# Patient Record
Sex: MALE | Race: WHITE | NOT HISPANIC OR LATINO
[De-identification: names, ages, dates, MRNs, and addresses within clinical notes are randomized per-mention and may not be internally consistent; named-entity substitution may affect disease eponyms.]

---

## 2017-02-22 ENCOUNTER — TRANSCRIPTION ENCOUNTER (OUTPATIENT)
Age: 52
End: 2017-02-22

## 2019-11-29 ENCOUNTER — INPATIENT (INPATIENT)
Facility: HOSPITAL | Age: 54
LOS: 0 days | Discharge: HOME | End: 2019-11-30
Attending: HOSPITALIST | Admitting: HOSPITALIST
Payer: COMMERCIAL

## 2019-11-29 VITALS
TEMPERATURE: 97 F | OXYGEN SATURATION: 98 % | SYSTOLIC BLOOD PRESSURE: 156 MMHG | DIASTOLIC BLOOD PRESSURE: 80 MMHG | RESPIRATION RATE: 18 BRPM | HEART RATE: 74 BPM

## 2019-11-29 LAB
ALBUMIN SERPL ELPH-MCNC: 4.8 G/DL — SIGNIFICANT CHANGE UP (ref 3.5–5.2)
ALP SERPL-CCNC: 70 U/L — SIGNIFICANT CHANGE UP (ref 30–115)
ALT FLD-CCNC: 19 U/L — SIGNIFICANT CHANGE UP (ref 0–41)
ANION GAP SERPL CALC-SCNC: 16 MMOL/L — HIGH (ref 7–14)
AST SERPL-CCNC: 37 U/L — SIGNIFICANT CHANGE UP (ref 0–41)
BASOPHILS # BLD AUTO: 0.05 K/UL — SIGNIFICANT CHANGE UP (ref 0–0.2)
BASOPHILS NFR BLD AUTO: 0.6 % — SIGNIFICANT CHANGE UP (ref 0–1)
BILIRUB SERPL-MCNC: 0.5 MG/DL — SIGNIFICANT CHANGE UP (ref 0.2–1.2)
BUN SERPL-MCNC: 19 MG/DL — SIGNIFICANT CHANGE UP (ref 10–20)
CALCIUM SERPL-MCNC: 9.6 MG/DL — SIGNIFICANT CHANGE UP (ref 8.5–10.1)
CHLORIDE SERPL-SCNC: 103 MMOL/L — SIGNIFICANT CHANGE UP (ref 98–110)
CK MB CFR SERPL CALC: 44.8 NG/ML — HIGH (ref 0.6–6.3)
CO2 SERPL-SCNC: 21 MMOL/L — SIGNIFICANT CHANGE UP (ref 17–32)
CREAT SERPL-MCNC: 0.8 MG/DL — SIGNIFICANT CHANGE UP (ref 0.7–1.5)
EOSINOPHIL # BLD AUTO: 0.24 K/UL — SIGNIFICANT CHANGE UP (ref 0–0.7)
EOSINOPHIL NFR BLD AUTO: 2.6 % — SIGNIFICANT CHANGE UP (ref 0–8)
GLUCOSE SERPL-MCNC: 102 MG/DL — HIGH (ref 70–99)
HCT VFR BLD CALC: 49.8 % — SIGNIFICANT CHANGE UP (ref 42–52)
HGB BLD-MCNC: 16.5 G/DL — SIGNIFICANT CHANGE UP (ref 14–18)
IMM GRANULOCYTES NFR BLD AUTO: 0.4 % — HIGH (ref 0.1–0.3)
LIDOCAIN IGE QN: 11 U/L — SIGNIFICANT CHANGE UP (ref 7–60)
LYMPHOCYTES # BLD AUTO: 2.04 K/UL — SIGNIFICANT CHANGE UP (ref 1.2–3.4)
LYMPHOCYTES # BLD AUTO: 22.5 % — SIGNIFICANT CHANGE UP (ref 20.5–51.1)
MAGNESIUM SERPL-MCNC: 1.8 MG/DL — SIGNIFICANT CHANGE UP (ref 1.8–2.4)
MCHC RBC-ENTMCNC: 28.5 PG — SIGNIFICANT CHANGE UP (ref 27–31)
MCHC RBC-ENTMCNC: 33.1 G/DL — SIGNIFICANT CHANGE UP (ref 32–37)
MCV RBC AUTO: 86 FL — SIGNIFICANT CHANGE UP (ref 80–94)
MONOCYTES # BLD AUTO: 0.82 K/UL — HIGH (ref 0.1–0.6)
MONOCYTES NFR BLD AUTO: 9 % — SIGNIFICANT CHANGE UP (ref 1.7–9.3)
NEUTROPHILS # BLD AUTO: 5.88 K/UL — SIGNIFICANT CHANGE UP (ref 1.4–6.5)
NEUTROPHILS NFR BLD AUTO: 64.9 % — SIGNIFICANT CHANGE UP (ref 42.2–75.2)
NRBC # BLD: 0 /100 WBCS — SIGNIFICANT CHANGE UP (ref 0–0)
PLATELET # BLD AUTO: 291 K/UL — SIGNIFICANT CHANGE UP (ref 130–400)
POTASSIUM SERPL-MCNC: 4.5 MMOL/L — SIGNIFICANT CHANGE UP (ref 3.5–5)
POTASSIUM SERPL-SCNC: 4.5 MMOL/L — SIGNIFICANT CHANGE UP (ref 3.5–5)
PROT SERPL-MCNC: 7.5 G/DL — SIGNIFICANT CHANGE UP (ref 6–8)
RBC # BLD: 5.79 M/UL — SIGNIFICANT CHANGE UP (ref 4.7–6.1)
RBC # FLD: 12.8 % — SIGNIFICANT CHANGE UP (ref 11.5–14.5)
SODIUM SERPL-SCNC: 140 MMOL/L — SIGNIFICANT CHANGE UP (ref 135–146)
TROPONIN T SERPL-MCNC: 0.09 NG/ML — CRITICAL HIGH
TROPONIN T SERPL-MCNC: 0.23 NG/ML — CRITICAL HIGH
WBC # BLD: 9.07 K/UL — SIGNIFICANT CHANGE UP (ref 4.8–10.8)
WBC # FLD AUTO: 9.07 K/UL — SIGNIFICANT CHANGE UP (ref 4.8–10.8)

## 2019-11-29 PROCEDURE — 93010 ELECTROCARDIOGRAM REPORT: CPT

## 2019-11-29 PROCEDURE — 71275 CT ANGIOGRAPHY CHEST: CPT | Mod: 26

## 2019-11-29 PROCEDURE — 71045 X-RAY EXAM CHEST 1 VIEW: CPT | Mod: 26

## 2019-11-29 PROCEDURE — 99291 CRITICAL CARE FIRST HOUR: CPT

## 2019-11-29 RX ORDER — MORPHINE SULFATE 50 MG/1
4 CAPSULE, EXTENDED RELEASE ORAL ONCE
Refills: 0 | Status: DISCONTINUED | OUTPATIENT
Start: 2019-11-29 | End: 2019-11-29

## 2019-11-29 RX ORDER — ACETAMINOPHEN 500 MG
650 TABLET ORAL ONCE
Refills: 0 | Status: COMPLETED | OUTPATIENT
Start: 2019-11-29 | End: 2019-11-29

## 2019-11-29 RX ORDER — ASPIRIN/CALCIUM CARB/MAGNESIUM 324 MG
325 TABLET ORAL ONCE
Refills: 0 | Status: COMPLETED | OUTPATIENT
Start: 2019-11-29 | End: 2019-11-29

## 2019-11-29 RX ORDER — PANTOPRAZOLE SODIUM 20 MG/1
40 TABLET, DELAYED RELEASE ORAL
Refills: 0 | Status: DISCONTINUED | OUTPATIENT
Start: 2019-11-29 | End: 2019-11-30

## 2019-11-29 RX ORDER — SODIUM CHLORIDE 9 MG/ML
1000 INJECTION, SOLUTION INTRAVENOUS ONCE
Refills: 0 | Status: COMPLETED | OUTPATIENT
Start: 2019-11-29 | End: 2019-11-29

## 2019-11-29 RX ORDER — DIPHENHYDRAMINE HCL 50 MG
50 CAPSULE ORAL ONCE
Refills: 0 | Status: COMPLETED | OUTPATIENT
Start: 2019-11-29 | End: 2019-11-29

## 2019-11-29 RX ORDER — TICAGRELOR 90 MG/1
1 TABLET ORAL
Qty: 60 | Refills: 0
Start: 2019-11-29 | End: 2019-12-28

## 2019-11-29 RX ORDER — METOPROLOL TARTRATE 50 MG
25 TABLET ORAL
Refills: 0 | Status: DISCONTINUED | OUTPATIENT
Start: 2019-11-29 | End: 2019-11-30

## 2019-11-29 RX ORDER — ONDANSETRON 8 MG/1
4 TABLET, FILM COATED ORAL ONCE
Refills: 0 | Status: COMPLETED | OUTPATIENT
Start: 2019-11-29 | End: 2019-11-29

## 2019-11-29 RX ORDER — FAMOTIDINE 10 MG/ML
20 INJECTION INTRAVENOUS ONCE
Refills: 0 | Status: COMPLETED | OUTPATIENT
Start: 2019-11-29 | End: 2019-11-29

## 2019-11-29 RX ORDER — ENOXAPARIN SODIUM 100 MG/ML
40 INJECTION SUBCUTANEOUS DAILY
Refills: 0 | Status: DISCONTINUED | OUTPATIENT
Start: 2019-11-30 | End: 2019-11-30

## 2019-11-29 RX ORDER — TICAGRELOR 90 MG/1
90 TABLET ORAL EVERY 12 HOURS
Refills: 0 | Status: DISCONTINUED | OUTPATIENT
Start: 2019-11-29 | End: 2019-11-30

## 2019-11-29 RX ORDER — AMLODIPINE BESYLATE 2.5 MG/1
5 TABLET ORAL DAILY
Refills: 0 | Status: DISCONTINUED | OUTPATIENT
Start: 2019-11-29 | End: 2019-11-30

## 2019-11-29 RX ORDER — SODIUM CHLORIDE 9 MG/ML
1000 INJECTION INTRAMUSCULAR; INTRAVENOUS; SUBCUTANEOUS
Refills: 0 | Status: DISCONTINUED | OUTPATIENT
Start: 2019-11-29 | End: 2019-11-30

## 2019-11-29 RX ORDER — NICOTINE POLACRILEX 2 MG
1 GUM BUCCAL DAILY
Refills: 0 | Status: DISCONTINUED | OUTPATIENT
Start: 2019-11-29 | End: 2019-11-30

## 2019-11-29 RX ORDER — ATORVASTATIN CALCIUM 80 MG/1
80 TABLET, FILM COATED ORAL AT BEDTIME
Refills: 0 | Status: DISCONTINUED | OUTPATIENT
Start: 2019-11-29 | End: 2019-11-30

## 2019-11-29 RX ORDER — ASPIRIN/CALCIUM CARB/MAGNESIUM 324 MG
81 TABLET ORAL DAILY
Refills: 0 | Status: DISCONTINUED | OUTPATIENT
Start: 2019-11-29 | End: 2019-11-30

## 2019-11-29 RX ADMIN — Medication 25 MILLIGRAM(S): at 18:24

## 2019-11-29 RX ADMIN — ATORVASTATIN CALCIUM 80 MILLIGRAM(S): 80 TABLET, FILM COATED ORAL at 21:50

## 2019-11-29 RX ADMIN — ONDANSETRON 4 MILLIGRAM(S): 8 TABLET, FILM COATED ORAL at 09:15

## 2019-11-29 RX ADMIN — Medication 650 MILLIGRAM(S): at 22:10

## 2019-11-29 RX ADMIN — Medication 30 MILLILITER(S): at 10:39

## 2019-11-29 RX ADMIN — MORPHINE SULFATE 4 MILLIGRAM(S): 50 CAPSULE, EXTENDED RELEASE ORAL at 10:05

## 2019-11-29 RX ADMIN — FAMOTIDINE 20 MILLIGRAM(S): 10 INJECTION INTRAVENOUS at 09:15

## 2019-11-29 RX ADMIN — TICAGRELOR 90 MILLIGRAM(S): 90 TABLET ORAL at 18:24

## 2019-11-29 RX ADMIN — Medication 50 MILLIGRAM(S): at 10:39

## 2019-11-29 RX ADMIN — Medication 325 MILLIGRAM(S): at 13:05

## 2019-11-29 RX ADMIN — Medication 650 MILLIGRAM(S): at 19:31

## 2019-11-29 RX ADMIN — SODIUM CHLORIDE 1000 MILLILITER(S): 9 INJECTION, SOLUTION INTRAVENOUS at 09:15

## 2019-11-29 NOTE — PROGRESS NOTE ADULT - SUBJECTIVE AND OBJECTIVE BOX
Cardiology Follow up    MAGED DEL CID   54y Male  PAST MEDICAL & SURGICAL HISTORY:  GERD (gastroesophageal reflux disease)  HTN (hypertension)       HPI:  54 M active smoker, + FH of premature MI in brother, gastritis, HTN, in active chest pain started few days ago, elevated trop. The pt was admitted for chest pain, pt seen and examined in ER in active chest pain, pt taken to cath lab urgently. (29 Nov 2019 13:08)    Allergies    No Known Allergies    Intolerances    Denies CP, SOB, palpitations, or dizziness  No events on telemetry overnight    Vital Signs Last 24 Hrs  T(C): 35.9 (29 Nov 2019 08:43), Max: 35.9 (29 Nov 2019 08:43)  T(F): 96.6 (29 Nov 2019 08:43), Max: 96.6 (29 Nov 2019 08:43)  HR: 74 (29 Nov 2019 08:43) (74 - 74)  BP: 156/80 (29 Nov 2019 08:43) (156/80 - 156/80)  BP(mean): --  RR: 18 (29 Nov 2019 08:43) (18 - 18)  SpO2: 98% (29 Nov 2019 08:43) (98% - 98%)    MEDICATIONS  (STANDING):  aspirin  chewable 81 milliGRAM(s) Oral daily  atorvastatin 80 milliGRAM(s) Oral at bedtime  metoprolol tartrate 25 milliGRAM(s) Oral two times a day  sodium chloride 0.9%. 1000 milliLiter(s) (100 mL/Hr) IV Continuous <Continuous>  ticagrelor 90 milliGRAM(s) Oral every 12 hours    MEDICATIONS  (PRN):      REVIEW OF SYSTEMS:          CONSTITUTIONAL: No weakness, fevers or chills          EYES/ENT: No visual changes;  No vertigo or throat pain           NECK: No pain or stiffness          RESPIRATORY: No cough, wheezing, hemoptysis          CARDIOVASCULAR: no pain, no BACA, no palpitations           GASTROINTESTINAL: No abdominal or epigastric pain. No nausea, vomiting, or hematemesis;           GENITOURINARY: No dysuria, frequency or hematuria          NEUROLOGICAL: No numbness or weakness          SKIN: No itching, rashes    PHYSICAL EXAM:           CONSTITUTIONAL: Well-developed; well-nourished; in no acute distress  	SKIN: warm, dry  	HEAD: Normocephalic; atraumatic  	EYES: PERRL.  	ENT: No nasal discharge, airway clear, mucous membranes moist  	NECK: Supple; non tender.  	CARD: +S1, +S2, no murmurs, gallops, or rubs. (Regular) rate and rhythm    	RESP: No wheezes, rales or rhonchi. CTA B/L  	ABD: soft ntnd, + BS x 4 quadrants  	EXT: moves all extremities,  no clubbing, cyanosis or edema  	NEURO: Alert and oriented x3, no focal deficits          PSYCH: Cooperative, appropriate          VASCULAR:  + Rad / + PTs / + DPs          EXTREMITY:             Right Groin:  Dressing C/D/I, + pulses, access site soft, no hematoma, no pain, no numbness, no signs and symptoms of infection  	   Right Radial:  D-stat bracelet in place, C/D/I, +pulses, access site soft, no hematoma, no pain, no numbness, no signs and symptoms of infection          LABS:                        16.5   9.07  )-----------( 291      ( 29 Nov 2019 09:11 )             49.8     11-29    140  |  103  |  19  ----------------------------<  102<H>  4.5   |  21  |  0.8    Ca    9.6      29 Nov 2019 09:11  Mg     1.8     11-29    TPro  7.5  /  Alb  4.8  /  TBili  0.5  /  DBili  x   /  AST  37  /  ALT  19  /  AlkPhos  70  11-29    CARDIAC MARKERS ( 29 Nov 2019 12:32 )  x     / 0.23 ng/mL / x     / x     / 44.8 ng/mL  CARDIAC MARKERS ( 29 Nov 2019 09:11 )  x     / 0.09 ng/mL / x     / x     / x          Magnesium, Serum: 1.8 mg/dL [1.8 - 2.4] (11-29-19 @ 09:11)  LIVER FUNCTIONS - ( 29 Nov 2019 09:11 )  Alb: 4.8 g/dL / Pro: 7.5 g/dL / ALK PHOS: 70 U/L / ALT: 19 U/L / AST: 37 U/L / GGT: x             S/P PCI prox and distal RCA SANG x 3/ NSTEMI  	     Continue DAPT (asa 81mg daily, brilinta 90mg q 12 ),  B-Blocker, Statin Therapy                   Brilinta coverage confirmed, $5 copay, pt aware and agreeable, RX sent                   monitor access sites( R radial and R femoral)                    IV hydration per orders                   Patient agreeing to take DAPT for at least one year or as directed by cardiologist                    Pt given instructions on importance of taking antiplatelet medication or risk acute stent thrombosis/death                   Post cath instructions, access site care and activity restrictions reviewed with patient                     Discussed with patient to return to hospital if experience chest pain, shortness breath, dizziness and site bleeding                   Aggressive risk factor modification, diet counseling, smoking cessation discussed with patient                       Follow up with Dr. Casey in am

## 2019-11-29 NOTE — H&P CARDIOLOGY - RADIOLOGY RESULTS AND INTERPRETATION
< from: CT Angio Chest Dissection Protocol (11.29.19 @ 11:10) >    IMPRESSION:    No evidence of intramural hematoma or aortic dissection.    < from: Xray Chest 1 View-PORTABLE IMMEDIATE (11.29.19 @ 09:18) >    IMPRESSION:      No radiographic evidence of acute cardiopulmonary disease.

## 2019-11-29 NOTE — ED PROVIDER NOTE - CARE PLAN
Principal Discharge DX:	Unstable angina  Secondary Diagnosis:	NSTEMI (non-ST elevated myocardial infarction)

## 2019-11-29 NOTE — ED PROVIDER NOTE - PROGRESS NOTE DETAILS
PODLOG: Pt with worsening pain in ED. Serial EKGs with no ischemic changes. Trop result reviewed. Pending CTA. PODLOG: CTA reviewed. Pt still with pain. Consulted cardiology who is bedside. nitro given. Repeat CE's ordered. PODLOg: Pt evaluated by Dr. Aguilar, will admit to tele, possible cath today.

## 2019-11-29 NOTE — CHART NOTE - NSCHARTNOTEFT_GEN_A_CORE
PRE-OP DIAGNOSIS: NSTEMI    PROCEDURE: [facility] with coronary angiography    Physician: ENRICO Casey  Assistant: Jeff    ANESTHESIA TYPE:  [ x ] Sedation  [x  ] Local/Regional    ESTIMATED BLOOD LOSS:    10   mL    CONDITIONr  [ x ]Good    IV CONTRAST:             mL      IMPLANTS (IF APPLICABLE)  SANG X 3    FINDINGS    Left Heart Catheterization:  LVEF%: 55  LVEDP: WNL  1 v CAD      LEFT HEART CATHETERIZATION                                    Left main Normal    LAD: Normal                       Diag: Normal    Left Circumflex: Normal  OM: Normal    Right Coronary Artery: Prox RCA 99%, Distal RCA 90  RPDA Mild ds      INTERVENTION  PCI of Prox RCA    POST-OP DIAGNOSIS  1 V CAD      PLAN OF CARE  [ x] Return to In-patient bed  [x ]  Continue DAPT, B-blocker & Statin therapy  NS 100cc/hr for 12 hrs

## 2019-11-29 NOTE — H&P CARDIOLOGY - COMMENTS
54 M active smoker w/PMH GERD, HTN presented for 1 week intermittent abdominal pain radiating to chest and jaw.     # Assessment   - Chest pain/ACS/NSTEMI    # Plan  - urgent cath  - s/p  mg   - serial ECGs  - f/u trops post cath 54 M active smoker w/PMH GERD, HTN presented for 1 week intermittent abdominal pain radiating to chest and jaw.     # Assessment   - Chest pain/ACS/NSTEMI    # Plan  - s/p urgent cath found to have RCA stenosis, s/p 3 SANG in the RCA   - s/p  mg   - c/w ASA 81 mg qd and Brilinta   - serial ECGs  - f/u trops post cath 54 M active smoker w/PMH GERD, HTN presented for 1 week intermittent abdominal pain radiating to chest and jaw and both UE.    # Assessment   - Chest pain/ACS/NSTEMI  - HTN  - GERD     # Plan  - s/p urgent cath found to have RCA stenosis; prox RCA 99%, distal RCA 90%, RPDA mild dz, s/p 3 SANG in the RCA   - s/p  mg   - c/w ASA 81 mg qd and Brilinta 90 mg bid  - high intensity statin qhs  - obtain ECG if pt c/o chest pain  - c/w amlodipine, may need to add second BP med for better BP control. Reassess once fluids complete  - c/w ptx 40 mg qd while in patient, pt requesting GI referral for screening colonoscopy and EGD for gastritis  - pt requesting Tdap vaccine, last one in 2009    Diet: DASH  ambulate as tolerated   Dispo: cardio to see in am

## 2019-11-29 NOTE — ED PROVIDER NOTE - OBJECTIVE STATEMENT
Pt is a 55 y/o male with hx of gastritis, HTN, presents to ED for abd pain radiating into chest, jaw and bilateral arms for 1 weeks intermittent, moderate, worse since onset. + n/v. No diarrhea, fever, cough, urinary complaints.

## 2019-11-29 NOTE — H&P CARDIOLOGY - HISTORY OF PRESENT ILLNESS
53 yo M active smoker, + FH of premature MI in brother in active chest pain started few days ago, elevated trop    admitted for chest pain, pt seen and examined in ER in active chest pain, py taken to cath lab for uregnt cath 54 M active smoker, + FH of premature MI in brother, gastritis, HTN, in active chest pain started few days ago, elevated trop. The pt was admitted for chest pain, pt seen and examined in ER in active chest pain, pt taken to cath lab urgently. 54 M active smoker w/PMH gastritis, HTN presented with 7 days of severe dyspepsia, ab pain radiating to the jaw and down both arms, along with chest pressure, which suddenly worsened during Thanksgiving. The pt states the pain was so severe he could not sleep at night and he experienced 4 episodes of emesis (NBNB). In the ED, the pt received NG, morphine and benadryl for ichiness. Initial trops were elevated to 0.09, followed by 0.23. The pt was taken to an urgent cath and found to have 1 vessel disease s/p 3 SANG placed in RCA.

## 2019-11-29 NOTE — H&P CARDIOLOGY - FAMILY HISTORY
Mother  Still living? Unknown  Family history of cardiac arrest, Age at diagnosis: Age Unknown     Sibling  Still living? Unknown  Family history of cardiac arrest, Age at diagnosis: Age Unknown

## 2019-11-29 NOTE — ED PROVIDER NOTE - OTHER FINDINGS
NSR at 80. Normal axis. Normal intervals. No acute ST-T changes. NSR at 58. Normal axis. normal intervals. No acute St-t changes. Sinus at 59. Normal axis. normal intervals. no acute ST-T changes.

## 2019-11-29 NOTE — H&P CARDIOLOGY - LAB RESULTS AND INTERPRETATION
Troponin T, Serum: 0.09 11/29/19 10:09  Troponin T, Serum: 0.23: Critical value: ng/mL (11.29.19 @ 12:32)

## 2019-11-29 NOTE — ED PROVIDER NOTE - CLINICAL SUMMARY MEDICAL DECISION MAKING FREE TEXT BOX
Pt presented to ED for abd pain, radiating to chest. Labs, EKG and advanced imaging obtained. Pt with ongoing pain so serial EKGs obtained and reviewed, no dynamic changes. Trop reviewed and discussed with cardiology due to concern of ongoing symptoms. Pt evaluated by cardiology and taken to cath labs. Admitted to telemetry to medicine team. Pt stable on admission.

## 2019-11-29 NOTE — ED PROVIDER NOTE - CRITICAL CARE PROVIDED
interpretation of diagnostic studies/additional history taking/consultation with other physicians/documentation/direct patient care (not related to procedure)/consult w/ pt's family directly relating to pts condition

## 2019-11-29 NOTE — ED PROVIDER NOTE - NS ED ROS FT
Constitutional: No fever, chills.  Eyes: No visual changes.  ENT: No hearing changes. No sore throat.  Neck: No neck pain or stiffness.  Cardiovascular: + chest pain. No palpitations, edema.  Pulmonary: No SOB, cough. No hemoptysis.  Abdominal: +abdominal pain, nausea, vomiting. No diarrhea.  : No dysuria, frequency.  Neuro: No headache, syncope, dizziness.  MS: No back pain. No calf pain/swelling.  Psych: No suicidal ideations.

## 2019-11-30 ENCOUNTER — TRANSCRIPTION ENCOUNTER (OUTPATIENT)
Age: 54
End: 2019-11-30

## 2019-11-30 VITALS
TEMPERATURE: 97 F | SYSTOLIC BLOOD PRESSURE: 133 MMHG | RESPIRATION RATE: 18 BRPM | HEART RATE: 69 BPM | DIASTOLIC BLOOD PRESSURE: 73 MMHG

## 2019-11-30 LAB
ANION GAP SERPL CALC-SCNC: 17 MMOL/L — HIGH (ref 7–14)
BASOPHILS # BLD AUTO: 0.05 K/UL — SIGNIFICANT CHANGE UP (ref 0–0.2)
BASOPHILS NFR BLD AUTO: 0.4 % — SIGNIFICANT CHANGE UP (ref 0–1)
BUN SERPL-MCNC: 16 MG/DL — SIGNIFICANT CHANGE UP (ref 10–20)
CALCIUM SERPL-MCNC: 9.2 MG/DL — SIGNIFICANT CHANGE UP (ref 8.5–10.1)
CHLORIDE SERPL-SCNC: 102 MMOL/L — SIGNIFICANT CHANGE UP (ref 98–110)
CHOLEST SERPL-MCNC: 190 MG/DL — SIGNIFICANT CHANGE UP (ref 100–200)
CO2 SERPL-SCNC: 22 MMOL/L — SIGNIFICANT CHANGE UP (ref 17–32)
CREAT SERPL-MCNC: 0.9 MG/DL — SIGNIFICANT CHANGE UP (ref 0.7–1.5)
EOSINOPHIL # BLD AUTO: 0.23 K/UL — SIGNIFICANT CHANGE UP (ref 0–0.7)
EOSINOPHIL NFR BLD AUTO: 1.9 % — SIGNIFICANT CHANGE UP (ref 0–8)
ESTIMATED AVERAGE GLUCOSE: 114 MG/DL — SIGNIFICANT CHANGE UP (ref 68–114)
GLUCOSE SERPL-MCNC: 94 MG/DL — SIGNIFICANT CHANGE UP (ref 70–99)
HBA1C BLD-MCNC: 5.6 % — SIGNIFICANT CHANGE UP (ref 4–5.6)
HCT VFR BLD CALC: 46.1 % — SIGNIFICANT CHANGE UP (ref 42–52)
HCT VFR BLD CALC: 46.7 % — SIGNIFICANT CHANGE UP (ref 42–52)
HDLC SERPL-MCNC: 32 MG/DL — LOW
HGB BLD-MCNC: 15.1 G/DL — SIGNIFICANT CHANGE UP (ref 14–18)
HGB BLD-MCNC: 15.1 G/DL — SIGNIFICANT CHANGE UP (ref 14–18)
IMM GRANULOCYTES NFR BLD AUTO: 0.4 % — HIGH (ref 0.1–0.3)
LIPID PNL WITH DIRECT LDL SERPL: 127 MG/DL — SIGNIFICANT CHANGE UP (ref 4–129)
LYMPHOCYTES # BLD AUTO: 16.2 % — LOW (ref 20.5–51.1)
LYMPHOCYTES # BLD AUTO: 2 K/UL — SIGNIFICANT CHANGE UP (ref 1.2–3.4)
MCHC RBC-ENTMCNC: 28.1 PG — SIGNIFICANT CHANGE UP (ref 27–31)
MCHC RBC-ENTMCNC: 28.4 PG — SIGNIFICANT CHANGE UP (ref 27–31)
MCHC RBC-ENTMCNC: 32.3 G/DL — SIGNIFICANT CHANGE UP (ref 32–37)
MCHC RBC-ENTMCNC: 32.8 G/DL — SIGNIFICANT CHANGE UP (ref 32–37)
MCV RBC AUTO: 86.8 FL — SIGNIFICANT CHANGE UP (ref 80–94)
MCV RBC AUTO: 87 FL — SIGNIFICANT CHANGE UP (ref 80–94)
MONOCYTES # BLD AUTO: 1.11 K/UL — HIGH (ref 0.1–0.6)
MONOCYTES NFR BLD AUTO: 9 % — SIGNIFICANT CHANGE UP (ref 1.7–9.3)
NEUTROPHILS # BLD AUTO: 8.93 K/UL — HIGH (ref 1.4–6.5)
NEUTROPHILS NFR BLD AUTO: 72.1 % — SIGNIFICANT CHANGE UP (ref 42.2–75.2)
NRBC # BLD: 0 /100 WBCS — SIGNIFICANT CHANGE UP (ref 0–0)
NRBC # BLD: 0 /100 WBCS — SIGNIFICANT CHANGE UP (ref 0–0)
PLATELET # BLD AUTO: 264 K/UL — SIGNIFICANT CHANGE UP (ref 130–400)
PLATELET # BLD AUTO: 285 K/UL — SIGNIFICANT CHANGE UP (ref 130–400)
POTASSIUM SERPL-MCNC: 4.5 MMOL/L — SIGNIFICANT CHANGE UP (ref 3.5–5)
POTASSIUM SERPL-SCNC: 4.5 MMOL/L — SIGNIFICANT CHANGE UP (ref 3.5–5)
RBC # BLD: 5.31 M/UL — SIGNIFICANT CHANGE UP (ref 4.7–6.1)
RBC # BLD: 5.37 M/UL — SIGNIFICANT CHANGE UP (ref 4.7–6.1)
RBC # FLD: 12.9 % — SIGNIFICANT CHANGE UP (ref 11.5–14.5)
RBC # FLD: 12.9 % — SIGNIFICANT CHANGE UP (ref 11.5–14.5)
SODIUM SERPL-SCNC: 141 MMOL/L — SIGNIFICANT CHANGE UP (ref 135–146)
TOTAL CHOLESTEROL/HDL RATIO MEASUREMENT: 5.9 RATIO — HIGH (ref 4–5.5)
TRIGL SERPL-MCNC: 235 MG/DL — HIGH (ref 10–149)
WBC # BLD: 12.29 K/UL — HIGH (ref 4.8–10.8)
WBC # BLD: 12.37 K/UL — HIGH (ref 4.8–10.8)
WBC # FLD AUTO: 12.29 K/UL — HIGH (ref 4.8–10.8)
WBC # FLD AUTO: 12.37 K/UL — HIGH (ref 4.8–10.8)

## 2019-11-30 PROCEDURE — 99236 HOSP IP/OBS SAME DATE HI 85: CPT

## 2019-11-30 PROCEDURE — 93306 TTE W/DOPPLER COMPLETE: CPT | Mod: 26

## 2019-11-30 RX ORDER — ACETAMINOPHEN 500 MG
650 TABLET ORAL EVERY 6 HOURS
Refills: 0 | Status: DISCONTINUED | OUTPATIENT
Start: 2019-11-30 | End: 2019-11-30

## 2019-11-30 RX ORDER — TICAGRELOR 90 MG/1
1 TABLET ORAL
Qty: 0 | Refills: 0 | DISCHARGE
Start: 2019-11-30

## 2019-11-30 RX ORDER — OMEPRAZOLE 10 MG/1
1 CAPSULE, DELAYED RELEASE ORAL
Qty: 0 | Refills: 0 | DISCHARGE

## 2019-11-30 RX ORDER — ASPIRIN/CALCIUM CARB/MAGNESIUM 324 MG
1 TABLET ORAL
Qty: 0 | Refills: 0 | DISCHARGE
Start: 2019-11-30

## 2019-11-30 RX ORDER — ATORVASTATIN CALCIUM 80 MG/1
1 TABLET, FILM COATED ORAL
Qty: 30 | Refills: 0
Start: 2019-11-30 | End: 2019-12-29

## 2019-11-30 RX ORDER — METOPROLOL TARTRATE 50 MG
1 TABLET ORAL
Qty: 0 | Refills: 0 | DISCHARGE
Start: 2019-11-30

## 2019-11-30 RX ORDER — TICAGRELOR 90 MG/1
1 TABLET ORAL
Qty: 60 | Refills: 0
Start: 2019-11-30 | End: 2019-12-29

## 2019-11-30 RX ORDER — ASPIRIN/CALCIUM CARB/MAGNESIUM 324 MG
1 TABLET ORAL
Qty: 30 | Refills: 0
Start: 2019-11-30 | End: 2019-12-29

## 2019-11-30 RX ORDER — AMLODIPINE BESYLATE 2.5 MG/1
1 TABLET ORAL
Qty: 30 | Refills: 0
Start: 2019-11-30 | End: 2019-12-29

## 2019-11-30 RX ORDER — AMLODIPINE BESYLATE 2.5 MG/1
1 TABLET ORAL
Qty: 0 | Refills: 0 | DISCHARGE

## 2019-11-30 RX ORDER — ATORVASTATIN CALCIUM 80 MG/1
1 TABLET, FILM COATED ORAL
Qty: 0 | Refills: 0 | DISCHARGE
Start: 2019-11-30

## 2019-11-30 RX ORDER — METOPROLOL TARTRATE 50 MG
1 TABLET ORAL
Qty: 60 | Refills: 0
Start: 2019-11-30 | End: 2019-12-29

## 2019-11-30 RX ORDER — OMEPRAZOLE 10 MG/1
1 CAPSULE, DELAYED RELEASE ORAL
Qty: 30 | Refills: 0
Start: 2019-11-30 | End: 2019-12-29

## 2019-11-30 RX ADMIN — TICAGRELOR 90 MILLIGRAM(S): 90 TABLET ORAL at 05:51

## 2019-11-30 RX ADMIN — AMLODIPINE BESYLATE 5 MILLIGRAM(S): 2.5 TABLET ORAL at 05:50

## 2019-11-30 RX ADMIN — Medication 650 MILLIGRAM(S): at 03:20

## 2019-11-30 RX ADMIN — Medication 650 MILLIGRAM(S): at 03:50

## 2019-11-30 RX ADMIN — Medication 650 MILLIGRAM(S): at 11:30

## 2019-11-30 RX ADMIN — PANTOPRAZOLE SODIUM 40 MILLIGRAM(S): 20 TABLET, DELAYED RELEASE ORAL at 06:14

## 2019-11-30 RX ADMIN — Medication 25 MILLIGRAM(S): at 05:50

## 2019-11-30 RX ADMIN — Medication 81 MILLIGRAM(S): at 11:30

## 2019-11-30 RX ADMIN — ENOXAPARIN SODIUM 40 MILLIGRAM(S): 100 INJECTION SUBCUTANEOUS at 11:31

## 2019-11-30 NOTE — DISCHARGE NOTE PROVIDER - NSDCMRMEDTOKEN_GEN_ALL_CORE_FT
amLODIPine 5 mg oral tablet: 1 tab(s) orally once a day  aspirin 81 mg oral tablet, chewable: 1 tab(s) orally once a day  atorvastatin 80 mg oral tablet: 1 tab(s) orally once a day (at bedtime)  metoprolol tartrate 25 mg oral tablet: 1 tab(s) orally 2 times a day  omeprazole 40 mg oral delayed release capsule: 1 cap(s) orally once a day  ticagrelor 90 mg oral tablet: 1 tab(s) orally every 12 hours amLODIPine 5 mg oral tablet: 1 tab(s) orally once a day  aspirin 81 mg oral tablet, chewable: 1 tab(s) orally once a day  aspirin 81 mg oral tablet, chewable: 1 tab(s) orally once a day  atorvastatin 80 mg oral tablet: 1 tab(s) orally once a day (at bedtime)  atorvastatin 80 mg oral tablet: 1 tab(s) orally once a day (at bedtime)  metoprolol tartrate 25 mg oral tablet: 1 tab(s) orally 2 times a day  metoprolol tartrate 25 mg oral tablet: 1 tab(s) orally 2 times a day  omeprazole 40 mg oral delayed release capsule: 1 cap(s) orally once a day  ticagrelor 90 mg oral tablet: 1 tab(s) orally every 12 hours  ticagrelor 90 mg oral tablet: 1 tab(s) orally every 12 hours

## 2019-11-30 NOTE — DISCHARGE NOTE NURSING/CASE MANAGEMENT/SOCIAL WORK - PATIENT PORTAL LINK FT
You can access the FollowMyHealth Patient Portal offered by [facility] by registering at the following website: [email or web address]. By joining [employer]’s FollowMyHealth portal, you will also be able to view your health information using other applications (apps) compatible with our system.

## 2019-11-30 NOTE — DISCHARGE NOTE PROVIDER - NSDCCPCAREPLAN_GEN_ALL_CORE_FT
PRINCIPAL DISCHARGE DIAGNOSIS  Diagnosis: Non-ST elevation MI (NSTEMI)  Assessment and Plan of Treatment: You were admitted to the hospital for NSTEMI. While in the hospital you had a cath performed and 3 drug eluting stents were placed. You were evaluated by Cardiology who recommended a medication regimen including dual antiplatelet therapy, beta blocker, and statin. Please take your medication as prescribed and follow up with Cardiology in 2-3 weeks.

## 2019-11-30 NOTE — PROGRESS NOTE ADULT - ATTENDING COMMENTS
pt seen and examined at bedside. states he feels like he is back to his baseline and eager for discharge.   -repeat ECHO pending today   -clearance by cardiology for discharge  -cont with ASA, brilinta, statin, norvasc  -will need cardiology outpt follow up

## 2019-11-30 NOTE — DISCHARGE NOTE NURSING/CASE MANAGEMENT/SOCIAL WORK - NSDCPEEMAIL_GEN_ALL_CORE
[facility] for Tobacco Control email [email or web address]

## 2019-11-30 NOTE — PROGRESS NOTE ADULT - ASSESSMENT
54 M active smoker w/PMH GERD, HTN presented for 1 week intermittent abdominal pain radiating to chest and jaw and both UE.    #Chest pain/ACS/NSTEMI  - S/p Cath: RCA Stenosis, Proximal 99%, Distal 90%, RPDA mild disease; 3 SANG placed  - Continue Aspirin, Brilinta, Statin    #HTN  - Continue Amlodipine    #GERD   - Continue Protonix  - Patient requesting GI referral for screening colonoscopy and EGD for gastritis    #Pending: Cardiology follow-up  #Disposition: Home  #Diet: DASH/TLC  #GI ppx: Protonix 40mg PO Daily  #DVT ppx: Lovenox 40mg SubQ qHS  #Activity: Ambulate as tolerated  #Code Status: Full Code 54 M active smoker w/PMH GERD, HTN presented for 1 week intermittent abdominal pain radiating to chest and jaw and both UE.    #Chest pain/ACS/NSTEMI  - S/p Cath: RCA Stenosis, Proximal 99%, Distal 90%, RPDA mild disease; 3 SANG placed  - Lipid Profile: Cholesterol 190, Triglycerides 235, HDL 32,   - Continue Aspirin, Brilinta, Statin    #HTN  - Continue Amlodipine    #GERD   - Continue Protonix  - Patient requesting GI referral for screening colonoscopy and EGD for gastritis    #Pending: Cardiology follow-up  #Disposition: Home  #Diet: DASH/TLC  #GI ppx: Protonix 40mg PO Daily  #DVT ppx: Lovenox 40mg SubQ qHS  #Activity: Ambulate as tolerated  #Code Status: Full Code 54 M active smoker w/PMH GERD, HTN presented for 1 week intermittent abdominal pain radiating to chest and jaw and both UE.    #Chest pain/ACS/NSTEMI  - Troponin 0.09>>0.23; CKMB 44.8  - S/p Cath: RCA Stenosis, Proximal 99%, Distal 90%, RPDA mild disease; 3 SANG placed  - Lipid Profile: Cholesterol 190, Triglycerides 235, HDL 32,   - Continue Aspirin, Brilinta, Statin    #HTN  - Continue Amlodipine    #GERD   - Continue Protonix  - Patient requesting GI referral for screening colonoscopy and EGD for gastritis    #Pending: Cardiology follow-up  #Disposition: Home  #Diet: DASH/TLC  #GI ppx: Protonix 40mg PO Daily  #DVT ppx: Lovenox 40mg SubQ qHS  #Activity: Ambulate as tolerated  #Code Status: Full Code

## 2019-11-30 NOTE — PROGRESS NOTE ADULT - SUBJECTIVE AND OBJECTIVE BOX
Patient Information:  MAGED DEL CID / 54y / Male / MRN#:[id number]    Hospital Day: 1d    HPI:  54 M active smoker w/PMH gastritis, HTN presented with 7 days of severe dyspepsia, ab pain radiating to the jaw and down both arms, along with chest pressure, which suddenly worsened during Thanksgiving. The pt states the pain was so severe he could not sleep at night and he experienced 4 episodes of emesis (NBNB). In the ED, the pt received NG, morphine and benadryl for ichiness. Initial trops were elevated to 0.09, followed by 0.23. The pt was taken to an urgent cath and found to have 1 vessel disease s/p 3 SANG placed in RCA.    Interval History:  Patient seen and examined at bedside. No acute events overnight.    Past Medical History:  GERD (gastroesophageal reflux disease)  HTN (hypertension)    Past Surgical History:    Allergies:  No Known Allergies    Medications:  PRN:  acetaminophen   Tablet .. 650 milliGRAM(s) Oral every 6 hours PRN Mild Pain (1 - 3)    Standing:  amLODIPine   Tablet 5 milliGRAM(s) Oral daily  aspirin  chewable 81 milliGRAM(s) Oral daily  atorvastatin 80 milliGRAM(s) Oral at bedtime  enoxaparin Injectable 40 milliGRAM(s) SubCutaneous daily  metoprolol tartrate 25 milliGRAM(s) Oral two times a day  nicotine - 21 mG/24Hr(s) Patch 1 patch Transdermal daily  pantoprazole    Tablet 40 milliGRAM(s) Oral before breakfast  sodium chloride 0.9%. 1000 milliLiter(s) (100 mL/Hr) IV Continuous <Continuous>  ticagrelor 90 milliGRAM(s) Oral every 12 hours    Home:  amLODIPine 5 mg oral tablet: 1 tab(s) orally once a day  omeprazole 40 mg oral delayed release capsule: 1 cap(s) orally once a day    Vitals:  T(C): 36.1, Max: 36.3 (11-29-19 @ 18:23)  T(F): 96.9, Max: 97.4 (11-29-19 @ 18:23)  HR: 62 (62 - 66)  BP: 129/79 (129/79 - 149/78)  RR: 18 (18 - 18)  SpO2: 98% (98% - 98%)    Physical Exam:  General: Awake, Alert. Not in acute distress.  Heart: Regular rate and rhythm; S1, S2; No murmurs.  Lungs: Clear to auscultation bilaterally.  Abdomen: Soft, nontender, nondistended.  Extremities: No edema in upper or lower extremities.  Neuro: AAOx3, No focal deficits.    Labs:  CBC (11-30 @ 07:38)                        Hgb: 15.1   WBC: 12.37 )-----------------( Plts: 285                              Hct: 46.7     Chem (11-29 @ 09:11)  Na: 140  |     Cl: 103     |  BUN: 19  -----------------------------------------< Gluc: 102    K: 4.5   |    HCO3: 21  |  Cr: 0.8    Ca 9.6 (11-29 @ 09:11)  Mg 1.8 (11-29 @ 09:11)    LFTs (11-29 @ 09:11)  TPro 7.5  /  Alb 4.8  TBili 0.5  /  DBili     AST 37  /  ALT 19  /  AlkPhos 70

## 2019-11-30 NOTE — CONSULT NOTE ADULT - ASSESSMENT
NSTEMI  HTN  Active smoker    S/p Cath  PCI of RCA with SANG  c/w DAPT, BB, Statin  2D echo  Risk factor modification

## 2019-11-30 NOTE — DISCHARGE NOTE PROVIDER - HOSPITAL COURSE
54 M active smoker w/PMH gastritis, HTN presented with 7 days of severe dyspepsia, ab pain radiating to the jaw and down both arms, along with chest pressure, which suddenly worsened during Thanksgiving. The pt states the pain was so severe he could not sleep at night and he experienced 4 episodes of emesis (NBNB). In the ED, the pt received NG, morphine and benadryl for ichiness. Initial trops were elevated to 0.09, followed by 0.23. The pt was taken to an urgent cath and found to have 1 vessel disease s/p 3 SANG placed in RCA.        Chest pain/ACS/NSTEMI    Troponin 0.09>>0.23; CKMB 44.8    S/p Cath: RCA Stenosis, Proximal 99%, Distal 90%, RPDA mild disease; 3 SANG placed    Lipid Profile: Cholesterol 190, Triglycerides 235, HDL 32,     Aspirin, Brilinta, Statin, Metoprolol

## 2019-11-30 NOTE — CONSULT NOTE ADULT - SUBJECTIVE AND OBJECTIVE BOX
Chief complaint:  Chest Pain    HPI:  54 M active smoker w/PMH gastritis, HTN presented with 7 days of severe dyspepsia, ab pain radiating to the jaw and down both arms, along with chest pressure, which suddenly worsened during Thanksgiving. The pt states the pain was so severe he could not sleep at night and he experienced 4 episodes of emesis (NBNB). In the ED, the pt received NG, morphine and benadryl for ichiness. Initial trops were elevated to 0.09, followed by 0.23. The pt was taken to an urgent cath and found to have 1 vessel disease s/p 3 SANG placed in RCA. (29 Nov 2019 13:08)      ROS:  Constitutional: No fever, chill, sweats  Eye: No recent visual problem  ENMT: No ear pain, nasal congestion, throat pain  Respiratoty: No SOB, cough  Cardiovascular: No chest pain, palpitaion, syncope  Gastrointestinal: No nausea, vomitting, diarhea  Genitourinary: No dysuria, hematuria  Heam/Lymp: No brusing tendency, no swollen glands  Endocrine: Negative for excessive hunger, thirst  Musculoskeletal: No neck pain, back pain, joint pain  Intergumentory: No rash, skin lesions  Neurologic: alert and oriented    PAST MEDICAL & SURGICAL HISTORY  GERD (gastroesophageal reflux disease)  HTN (hypertension)      FAMILY HISTORY:  FAMILY HISTORY:  Family history of cardiac arrest (Mother, Sibling): Brother at age 31 years old  Mother      SOCIAL HISTORY:  []smoker  []Alcohol  []Drug    ALLERGIES:  No Known Allergies      MEDICATIONS:  MEDICATIONS  (STANDING):  amLODIPine   Tablet 5 milliGRAM(s) Oral daily  aspirin  chewable 81 milliGRAM(s) Oral daily  atorvastatin 80 milliGRAM(s) Oral at bedtime  enoxaparin Injectable 40 milliGRAM(s) SubCutaneous daily  metoprolol tartrate 25 milliGRAM(s) Oral two times a day  nicotine - 21 mG/24Hr(s) Patch 1 patch Transdermal daily  pantoprazole    Tablet 40 milliGRAM(s) Oral before breakfast  sodium chloride 0.9%. 1000 milliLiter(s) (100 mL/Hr) IV Continuous <Continuous>  ticagrelor 90 milliGRAM(s) Oral every 12 hours    MEDICATIONS  (PRN):  acetaminophen   Tablet .. 650 milliGRAM(s) Oral every 6 hours PRN Mild Pain (1 - 3)      HOME MEDICATIONS:  Home Medications:  amLODIPine 5 mg oral tablet: 1 tab(s) orally once a day (29 Nov 2019 17:37)  omeprazole 40 mg oral delayed release capsule: 1 cap(s) orally once a day (29 Nov 2019 17:37)      VITALS:   T(F): 96.9 (11-30 @ 05:36), Max: 97.4 (11-29 @ 18:23)  HR: 62 (11-30 @ 05:36) (62 - 74)  BP: 129/79 (11-30 @ 05:36) (129/79 - 156/80)  BP(mean): --  RR: 18 (11-30 @ 05:36) (18 - 18)  SpO2: 98% (11-29 @ 18:23) (98% - 98%)    I&O's Summary    30 Nov 2019 07:01  -  30 Nov 2019 10:23  --------------------------------------------------------  IN: 420 mL / OUT: 580 mL / NET: -160 mL        PHYSICAL EXAM:  GEN: No acute distress  NECK: Supple, non tender, NO JVD, No carotid bruit,   LUNGS: Clear to auscultation bilaterally, non labored respiration  CARDIOVASCULAR: S1/S2 present, RRR , no murmus or rubs, + PP bilaterally  ABD: Soft, non-tender, non-distended,   EXT: No Lower extremity edema, no tenderness  NEURO: Non focal  SKIN: Intact    LABS:                        15.1   12.37 )-----------( 285      ( 30 Nov 2019 07:38 )             46.7     11-30    141  |  102  |  16  ----------------------------<  94  4.5   |  22  |  0.9    Ca    9.2      30 Nov 2019 07:38  Mg     1.8     11-29    TPro  7.5  /  Alb  4.8  /  TBili  0.5  /  DBili  x   /  AST  37  /  ALT  19  /  AlkPhos  70  11-29      Troponin T, Serum: 0.23 ng/mL <HH> (11-29-19 @ 12:32)    CARDIAC MARKERS ( 29 Nov 2019 12:32 )  x     / 0.23 ng/mL / x     / x     / 44.8 ng/mL  CARDIAC MARKERS ( 29 Nov 2019 09:11 )  x     / 0.09 ng/mL / x     / x     / x          11-30 Chol 190  HDL 32<L> Trig 235<H>  Hemoglobin A1C   Thyroid      ECG:  < from: 12 Lead ECG (11.29.19 @ 16:37) >  Diagnosis Line Sinus bradycardia  Possible Inferior infarct , age undetermined  Abnormal ECG    < end of copied text >

## 2019-11-30 NOTE — DISCHARGE NOTE NURSING/CASE MANAGEMENT/SOCIAL WORK - NSDCPEWEB_GEN_ALL_CORE
[facility] for Tobacco Control website --- [email or web address] website --- [email or web address]

## 2019-11-30 NOTE — DISCHARGE NOTE PROVIDER - CARE PROVIDER_API CALL
Vanessa Casey)  Cardiovascular Disease; Internal Medicine; Interventional Cardiology  [address] [place] [address], [facility] [place] [address]  Phone: [phone]  Fax: [phone]  Follow Up Time: 2 weeks    Demarco Bacon)  Gastroenterology; Internal Medicine  [address] [place] [address]  Phone: [phone]  Fax: [phone]  Follow Up Time:

## 2019-11-30 NOTE — PROGRESS NOTE ADULT - SUBJECTIVE AND OBJECTIVE BOX
Denies any chest pain, SOB or palpitations.     MEDICATIONS  (STANDING):  amLODIPine   Tablet 5 milliGRAM(s) Oral daily  aspirin  chewable 81 milliGRAM(s) Oral daily  atorvastatin 80 milliGRAM(s) Oral at bedtime  enoxaparin Injectable 40 milliGRAM(s) SubCutaneous daily  metoprolol tartrate 25 milliGRAM(s) Oral two times a day  nicotine - 21 mG/24Hr(s) Patch 1 patch Transdermal daily  pantoprazole    Tablet 40 milliGRAM(s) Oral before breakfast  sodium chloride 0.9%. 1000 milliLiter(s) (100 mL/Hr) IV Continuous <Continuous>  ticagrelor 90 milliGRAM(s) Oral every 12 hours    MEDICATIONS  (PRN):  acetaminophen   Tablet .. 650 milliGRAM(s) Oral every 6 hours PRN Mild Pain (1 - 3)            Vital Signs Last 24 Hrs  T(C): 35.9 (30 Nov 2019 13:21), Max: 36.3 (29 Nov 2019 18:23)  T(F): 96.7 (30 Nov 2019 13:21), Max: 97.4 (29 Nov 2019 18:23)  HR: 69 (30 Nov 2019 13:21) (62 - 69)  BP: 133/73 (30 Nov 2019 13:21) (129/79 - 149/78)  BP(mean): --  RR: 18 (30 Nov 2019 13:21) (18 - 18)  SpO2: 98% (29 Nov 2019 18:23) (98% - 98%)     REVIEW OF SYSTEMS:  CONSTITUTIONAL: No fever, weight loss, or fatigue  CARDIOLOGY: PAtient denies chest pain, shortness of breath or syncopal episodes.   RESPIRATORY: denies shortness of breath, wheezeing.   NEUROLOGICAL: NO weakness, no focal deficits to report.  GI: no BRBPR, no N,V,diarrhea.    PSYCHIATRY: normal mood and affect  HEENT: no nasal discharge, no ecchymosis  SKIN: no ecchymosis, no breakdown  MUSCULOSKELETAL: Full range of motion x4.        PHYSICAL EXAM:  · CONSTITUTIONAL:	Well-developed, well nourished    BMI-  ·RESPIRATORY:   airway patent; breath sounds equal; good air movement  · CARDIOVASCULAR	regular rate and rhythm  no rub  no murmur  normal PMI  · EXTREMITIES: No cyanosis, clubbing or edema  · VASCULAR: 	Equal and normal pulses (carotid, femoral, dorsalis pedis). No evidence of pseudo or hematoma at puncture site  	  TELEMETRY: Sinus    LABS:                        15.1   12.37 )-----------( 285      ( 30 Nov 2019 07:38 )             46.7     11-30    141  |  102  |  16  ----------------------------<  94  4.5   |  22  |  0.9    Ca    9.2      30 Nov 2019 07:38  Mg     1.8     11-29    TPro  7.5  /  Alb  4.8  /  TBili  0.5  /  DBili  x   /  AST  37  /  ALT  19  /  AlkPhos  70  11-29    CARDIAC MARKERS ( 29 Nov 2019 12:32 )  x     / 0.23 ng/mL / x     / x     / 44.8 ng/mL  CARDIAC MARKERS ( 29 Nov 2019 09:11 )  x     / 0.09 ng/mL / x     / x     / x              I&O's Summary    30 Nov 2019 07:01  -  30 Nov 2019 15:08  --------------------------------------------------------  IN: 1150 mL / OUT: 1470 mL / NET: -320 mL      BNP  RADIOLOGY & ADDITIONAL STUDIES:    IMPRESSION AND PLAN:    Continue current care  ASA 81 + Brilinta 90 bid (ordered by cardiology NP)  Statins / BB   Will F/U in 2-3 weeks post discharge

## 2019-12-13 DIAGNOSIS — I25.10 ATHEROSCLEROTIC HEART DISEASE OF NATIVE CORONARY ARTERY WITHOUT ANGINA PECTORIS: ICD-10-CM

## 2019-12-13 DIAGNOSIS — K29.70 GASTRITIS, UNSPECIFIED, WITHOUT BLEEDING: ICD-10-CM

## 2019-12-13 DIAGNOSIS — F17.210 NICOTINE DEPENDENCE, CIGARETTES, UNCOMPLICATED: ICD-10-CM

## 2019-12-13 DIAGNOSIS — I21.4 NON-ST ELEVATION (NSTEMI) MYOCARDIAL INFARCTION: ICD-10-CM

## 2019-12-13 DIAGNOSIS — I10 ESSENTIAL (PRIMARY) HYPERTENSION: ICD-10-CM

## 2019-12-13 DIAGNOSIS — Z82.49 FAMILY HISTORY OF ISCHEMIC HEART DISEASE AND OTHER DISEASES OF THE CIRCULATORY SYSTEM: ICD-10-CM

## 2019-12-13 DIAGNOSIS — K21.9 GASTRO-ESOPHAGEAL REFLUX DISEASE WITHOUT ESOPHAGITIS: ICD-10-CM

## 2020-09-08 PROBLEM — I10 ESSENTIAL (PRIMARY) HYPERTENSION: Chronic | Status: ACTIVE | Noted: 2019-11-29

## 2020-09-08 PROBLEM — K21.9 GASTRO-ESOPHAGEAL REFLUX DISEASE WITHOUT ESOPHAGITIS: Chronic | Status: ACTIVE | Noted: 2019-11-29

## 2020-09-09 ENCOUNTER — OUTPATIENT (OUTPATIENT)
Dept: OUTPATIENT SERVICES | Facility: HOSPITAL | Age: 55
LOS: 1 days | Discharge: HOME | End: 2020-09-09

## 2020-09-09 DIAGNOSIS — Z11.59 ENCOUNTER FOR SCREENING FOR OTHER VIRAL DISEASES: ICD-10-CM

## 2020-09-11 ENCOUNTER — OUTPATIENT (OUTPATIENT)
Dept: OUTPATIENT SERVICES | Facility: HOSPITAL | Age: 55
LOS: 1 days | Discharge: HOME | End: 2020-09-11

## 2020-09-11 LAB
ANION GAP SERPL CALC-SCNC: 11 MMOL/L — SIGNIFICANT CHANGE UP (ref 7–14)
BUN SERPL-MCNC: 17 MG/DL — SIGNIFICANT CHANGE UP (ref 10–20)
CALCIUM SERPL-MCNC: 9.2 MG/DL — SIGNIFICANT CHANGE UP (ref 8.5–10.1)
CHLORIDE SERPL-SCNC: 106 MMOL/L — SIGNIFICANT CHANGE UP (ref 98–110)
CO2 SERPL-SCNC: 23 MMOL/L — SIGNIFICANT CHANGE UP (ref 17–32)
CREAT SERPL-MCNC: 0.8 MG/DL — SIGNIFICANT CHANGE UP (ref 0.7–1.5)
GLUCOSE SERPL-MCNC: 93 MG/DL — SIGNIFICANT CHANGE UP (ref 70–99)
HCT VFR BLD CALC: 42.4 % — SIGNIFICANT CHANGE UP (ref 42–52)
HGB BLD-MCNC: 13.9 G/DL — LOW (ref 14–18)
MCHC RBC-ENTMCNC: 28.4 PG — SIGNIFICANT CHANGE UP (ref 27–31)
MCHC RBC-ENTMCNC: 32.8 G/DL — SIGNIFICANT CHANGE UP (ref 32–37)
MCV RBC AUTO: 86.5 FL — SIGNIFICANT CHANGE UP (ref 80–94)
PLATELET # BLD AUTO: 270 K/UL — SIGNIFICANT CHANGE UP (ref 130–400)
POTASSIUM SERPL-MCNC: 3.9 MMOL/L — SIGNIFICANT CHANGE UP (ref 3.5–5)
POTASSIUM SERPL-SCNC: 3.9 MMOL/L — SIGNIFICANT CHANGE UP (ref 3.5–5)
RBC # BLD: 4.9 M/UL — SIGNIFICANT CHANGE UP (ref 4.7–6.1)
RBC # FLD: 13 % — SIGNIFICANT CHANGE UP (ref 11.5–14.5)
SODIUM SERPL-SCNC: 140 MMOL/L — SIGNIFICANT CHANGE UP (ref 135–146)
WBC # BLD: 9.89 K/UL — SIGNIFICANT CHANGE UP (ref 4.8–10.8)
WBC # FLD AUTO: 9.89 K/UL — SIGNIFICANT CHANGE UP (ref 4.8–10.8)

## 2020-09-11 RX ORDER — METOPROLOL TARTRATE 50 MG
1 TABLET ORAL
Qty: 60 | Refills: 1
Start: 2020-09-11 | End: 2020-11-09

## 2020-09-11 RX ORDER — TICAGRELOR 90 MG/1
1 TABLET ORAL
Qty: 0 | Refills: 0 | DISCHARGE

## 2020-09-11 RX ORDER — ASPIRIN/CALCIUM CARB/MAGNESIUM 324 MG
1 TABLET ORAL
Qty: 30 | Refills: 0
Start: 2020-09-11 | End: 2020-10-10

## 2020-09-11 RX ORDER — METOPROLOL TARTRATE 50 MG
1 TABLET ORAL
Qty: 0 | Refills: 0 | DISCHARGE

## 2020-09-11 RX ORDER — AMLODIPINE BESYLATE 2.5 MG/1
1 TABLET ORAL
Qty: 30 | Refills: 0
Start: 2020-09-11 | End: 2020-10-10

## 2020-09-11 RX ORDER — LOSARTAN POTASSIUM 100 MG/1
1 TABLET, FILM COATED ORAL
Qty: 0 | Refills: 0 | DISCHARGE

## 2020-09-11 NOTE — CHART NOTE - NSCHARTNOTEFT_GEN_A_CORE
PRE-OP DIAGNOSIS: Hx fo STEMI, recurrent angina    PROCEDURE:[x] LHC                         [ x ] Coronary angiography                         [  ] [facility]  Physician: Dr Kraft  Assistant: Sherry    ANESTHESIA TYPE:  [  ]General Anesthesia  [ x ] Sedation  [  ] Local/Regional    ESTIMATED BLOOD LOSS:    10   mL    CONDITION  [  ] Critical  [  ] Serious  [ x ]Fair  [  ]Good    IV CONTRAST:       100      mL    FINDINGS  Left Heart Catheterization:  LVEF%: 65%  LVEDP: 29      ACCESS:    [x] left radial artery  [ ] right femoral artery    LEFT HEART CATHETERIZATION                                    Left main: no disease  LAD: Mild to moderate disease  Diag: luminal irregularities   Left Circumflex: mild disease  OM: mild disease  Right Coronary Artery: Patent stents, 40-50% distal edge stenosis  RPDA: luminal irregularities     INTERVENTION  IMPLANTS: none    SPECIMEN REMOVED: none      POST-OP DIAGNOSIS    Non-obstructive CAD      PLAN OF CARE  [x] D/C Home today, increase amlodipine to 10 mg daily   [x]  Continue DAPT, B-blocker & Statin therapy. Aggressive risk factors modification, weight loss, CPAP qHS.

## 2020-09-11 NOTE — ASU PATIENT PROFILE, ADULT - PMH
GERD (gastroesophageal reflux disease)    H/O right coronary artery stent placement    HTN (hypertension)    Myocardial infarction involving right coronary artery, unspecified MI type

## 2020-09-11 NOTE — H&P CARDIOLOGY - HISTORY OF PRESENT ILLNESS
Patient is a 54y Male PMH: STEMI 11/19 s/p PCI p/dRCA SANG x3, smoker (quit 11/19)HLD, HTN, mitral insuff, +FH CAD, GERD                                 PSH: none   Pt reports intermittent chest pain/fluttering at night resolving spontaneously, had negative stress echo 7/20, symptoms persist , [facility] recommended    Pre cath note:    indication:  [ ] STEMI                [ ] NSTEMI                 [ ] Acute coronary syndrome                     [x ]Unstable Angina   [ ] high risk  [ x] intermediate risk  [ ] low risk                     [ ] Stable Angina     non-invasive testing:  stress echo                        Date:    7/20                 result: [ ] high risk  [x ] intermediate risk  [ ] low risk    Anti- Anginal medications:                    [ ] not used                       [x ] used                   [ ] not used but strong indication not to use    Ejection Fraction                   [ ] <29            [ ] 30-39%   [ ] 40-49%     [x ]>50%    CHF                   [ ] active (within last 14 days on meds   [ ] Chronic (on meds but no exacerbation)    COPD                   [ ] mild (on chronic bronchodilators)  [ ] moderate (on chronic steroid therapy)      [ ] severe (indication for home O2 or PACO2 >50)    Other risk factors:                       [ x] Previous MI                     [ ] CVA/ stroke                    [ ] carotid stent/ CEA                    [ ] PVD/PAD- (arterial aneurysm, non-palpable pulses, tortuous vessel with inability to insert catheter, infra-renal dissection, renal or subclavian artery stenosis)                    [ ] diabetic                    [ ] previous CABG                    [ ] Renal Failure                                  REVIEW OF SYSTEMS:  CONSTITUTIONAL: No fever, weight loss, or fatigue  CARDIOLOGY: PAtient denies chest pain, shortness of breath or syncopal episodes.   RESPIRATORY: denies shortness of breath, wheezing  NEUROLOGICAL: NO weakness, no focal deficits to report.  ENDOCRINOLOGICAL: no recent change in diabetic medications.   GI: no BRBPR, no N,V,diarrhea.     PHYSICAL EXAM:  · CONSTITUTIONAL:	Well-developed, well nourished     ·RESPIRATORY:   airway patent; breath sounds equal; good air movement; respirations non-labored; clear to auscultation bilaterally; no chest wall tenderness; no intercostal retractions; no rales,rhonchi or wheeze  · CARDIOVASCULAR	regular rate and rhythm  no rub + murmur  normal PMI  · EXTREMITIES: No cyanosis, clubbing or edema  · VASCULAR: 	Equal and normal pulses (carotid, femoral, dorsalis pedis)  	L Charly test (suboptimal ulnar)    ECG: SR    TTE: EF 66%

## 2020-09-15 DIAGNOSIS — I25.2 OLD MYOCARDIAL INFARCTION: ICD-10-CM

## 2020-09-15 DIAGNOSIS — Z79.82 LONG TERM (CURRENT) USE OF ASPIRIN: ICD-10-CM

## 2020-09-15 DIAGNOSIS — I10 ESSENTIAL (PRIMARY) HYPERTENSION: ICD-10-CM

## 2020-09-15 DIAGNOSIS — Z95.5 PRESENCE OF CORONARY ANGIOPLASTY IMPLANT AND GRAFT: ICD-10-CM

## 2020-09-15 DIAGNOSIS — E78.00 PURE HYPERCHOLESTEROLEMIA, UNSPECIFIED: ICD-10-CM

## 2020-09-15 DIAGNOSIS — I20.8 OTHER FORMS OF ANGINA PECTORIS: ICD-10-CM

## 2020-09-15 DIAGNOSIS — I25.118 ATHEROSCLEROTIC HEART DISEASE OF NATIVE CORONARY ARTERY WITH OTHER FORMS OF ANGINA PECTORIS: ICD-10-CM

## 2022-03-26 ENCOUNTER — OUTPATIENT (OUTPATIENT)
Dept: OUTPATIENT SERVICES | Facility: HOSPITAL | Age: 57
LOS: 1 days | Discharge: HOME | End: 2022-03-26
Payer: COMMERCIAL

## 2022-03-26 DIAGNOSIS — M54.6 PAIN IN THORACIC SPINE: ICD-10-CM

## 2022-03-26 PROBLEM — I21.11 ST ELEVATION (STEMI) MYOCARDIAL INFARCTION INVOLVING RIGHT CORONARY ARTERY: Chronic | Status: ACTIVE | Noted: 2020-09-11

## 2022-03-26 PROBLEM — Z95.5 PRESENCE OF CORONARY ANGIOPLASTY IMPLANT AND GRAFT: Chronic | Status: ACTIVE | Noted: 2020-09-11

## 2022-03-26 PROCEDURE — 72072 X-RAY EXAM THORAC SPINE 3VWS: CPT | Mod: 26

## 2022-04-01 ENCOUNTER — OUTPATIENT (OUTPATIENT)
Dept: OUTPATIENT SERVICES | Facility: HOSPITAL | Age: 57
LOS: 1 days | Discharge: HOME | End: 2022-04-01

## 2022-04-01 ENCOUNTER — OUTPATIENT (OUTPATIENT)
Dept: OUTPATIENT SERVICES | Facility: HOSPITAL | Age: 57
LOS: 1 days | Discharge: HOME | End: 2022-04-01
Payer: COMMERCIAL

## 2022-04-01 DIAGNOSIS — R07.9 CHEST PAIN, UNSPECIFIED: ICD-10-CM

## 2022-04-01 PROCEDURE — 71046 X-RAY EXAM CHEST 2 VIEWS: CPT | Mod: 26

## 2022-04-04 DIAGNOSIS — Z82.62 FAMILY HISTORY OF OSTEOPOROSIS: ICD-10-CM

## 2022-04-04 DIAGNOSIS — M89.9 DISORDER OF BONE, UNSPECIFIED: ICD-10-CM

## 2022-04-04 DIAGNOSIS — Z13.820 ENCOUNTER FOR SCREENING FOR OSTEOPOROSIS: ICD-10-CM

## 2022-04-04 DIAGNOSIS — Z87.310 PERSONAL HISTORY OF (HEALED) OSTEOPOROSIS FRACTURE: ICD-10-CM

## 2022-04-15 ENCOUNTER — OUTPATIENT (OUTPATIENT)
Dept: OUTPATIENT SERVICES | Facility: HOSPITAL | Age: 57
LOS: 1 days | Discharge: HOME | End: 2022-04-15
Payer: COMMERCIAL

## 2022-04-15 DIAGNOSIS — M48.54XA COLLAPSED VERTEBRA, NOT ELSEWHERE CLASSIFIED, THORACIC REGION, INITIAL ENCOUNTER FOR FRACTURE: ICD-10-CM

## 2022-04-15 PROCEDURE — 72146 MRI CHEST SPINE W/O DYE: CPT | Mod: 26

## 2022-06-09 NOTE — ED ADULT NURSE NOTE - PRIMARY CARE PROVIDER
unknown Hydroxyzine Pregnancy And Lactation Text: This medication is not safe during pregnancy and should not be taken. It is also excreted in breast milk and breast feeding isn't recommended.

## 2023-01-19 ENCOUNTER — NON-APPOINTMENT (OUTPATIENT)
Age: 58
End: 2023-01-19

## 2023-06-27 ENCOUNTER — APPOINTMENT (OUTPATIENT)
Dept: NEUROLOGY | Facility: CLINIC | Age: 58
End: 2023-06-27
Payer: COMMERCIAL

## 2023-06-27 VITALS
DIASTOLIC BLOOD PRESSURE: 80 MMHG | HEIGHT: 66 IN | WEIGHT: 245 LBS | HEART RATE: 76 BPM | BODY MASS INDEX: 39.37 KG/M2 | SYSTOLIC BLOOD PRESSURE: 120 MMHG

## 2023-06-27 VITALS — HEART RATE: 79 BPM | SYSTOLIC BLOOD PRESSURE: 118 MMHG | DIASTOLIC BLOOD PRESSURE: 83 MMHG

## 2023-06-27 DIAGNOSIS — Z86.79 PERSONAL HISTORY OF OTHER DISEASES OF THE CIRCULATORY SYSTEM: ICD-10-CM

## 2023-06-27 DIAGNOSIS — Z82.49 FAMILY HISTORY OF ISCHEMIC HEART DISEASE AND OTHER DISEASES OF THE CIRCULATORY SYSTEM: ICD-10-CM

## 2023-06-27 DIAGNOSIS — Z87.11 PERSONAL HISTORY OF PEPTIC ULCER DISEASE: ICD-10-CM

## 2023-06-27 DIAGNOSIS — Z80.9 FAMILY HISTORY OF MALIGNANT NEOPLASM, UNSPECIFIED: ICD-10-CM

## 2023-06-27 DIAGNOSIS — Z86.39 PERSONAL HISTORY OF OTHER ENDOCRINE, NUTRITIONAL AND METABOLIC DISEASE: ICD-10-CM

## 2023-06-27 PROBLEM — Z00.00 ENCOUNTER FOR PREVENTIVE HEALTH EXAMINATION: Status: ACTIVE | Noted: 2023-06-27

## 2023-06-27 PROCEDURE — ZZZZZ: CPT

## 2023-06-27 RX ORDER — GABAPENTIN 300 MG/1
300 CAPSULE ORAL 3 TIMES DAILY
Qty: 90 | Refills: 2 | Status: ACTIVE | COMMUNITY
Start: 2023-06-27 | End: 1900-01-01

## 2023-06-27 NOTE — ASSESSMENT
[FreeTextEntry1] : Cervical / Lumbar Radiculopathy.\par \par - MRI of the Cervical / Lumbar Spine without chiara.\par - EMG/NCS of Upper and Lower extremities.\par - Trial of physical therapy. \par - Occupational Therapy. \par - Trial of Gabapentin, and I warned her of the potential side effects including drowsiness and mood changes, and patient reported understanding.\par - Follow up in 1 month.  if not better I will refer to pain management.\par \par \par \par \par I, Deidre Massey, Attest that this documentation has been prepared under the direction and in the presence of Provider Luther Holland DO\par \par Thank You for letting me assist in the management of this patient. \par \par Luther Holland DO\par Board Certified, Neurology\par  \par

## 2023-06-27 NOTE — HISTORY OF PRESENT ILLNESS
[FreeTextEntry1] : Mr. Mart is a 57 year old man who comes in for Chronic Neck and Back pain. Symptoms have been ongoing for years. He has severe numbness. Patient left leg is limited when walking, kneeling and standing for a short amount of time. He has no recent imaging and no previous treatment. His hands and feet lock up throughout his day. Denies any urinary bowel control issues. He feels weak due to numbness. He is on blood thinners since he had a heart attack in 2019. Xray of the cervical and lumbar spine which were both negative. He was given anti inflammatory medication for back pain.He gets small bruises throughout his body and he spoke with his cardiologist. Patients hypercholesterolemia is controlled with medication.Notes he stopped smoking cigarettes then continue to smoke after years of stopping. When it comes to sleep patient has never been checked for sleep apnea. He snores in his sleep. Denies waking up wheezing and gasping for air.

## 2023-07-18 ENCOUNTER — NON-APPOINTMENT (OUTPATIENT)
Age: 58
End: 2023-07-18

## 2023-07-22 ENCOUNTER — APPOINTMENT (OUTPATIENT)
Dept: MRI IMAGING | Facility: CLINIC | Age: 58
End: 2023-07-22

## 2023-07-25 ENCOUNTER — APPOINTMENT (OUTPATIENT)
Dept: NEUROLOGY | Facility: CLINIC | Age: 58
End: 2023-07-25
Payer: COMMERCIAL

## 2023-07-25 PROCEDURE — 95886 MUSC TEST DONE W/N TEST COMP: CPT

## 2023-07-25 PROCEDURE — 95913 NRV CNDJ TEST 13/> STUDIES: CPT

## 2023-08-25 ENCOUNTER — APPOINTMENT (OUTPATIENT)
Dept: NEUROLOGY | Facility: CLINIC | Age: 58
End: 2023-08-25
Payer: COMMERCIAL

## 2023-08-25 PROCEDURE — 99214 OFFICE O/P EST MOD 30 MIN: CPT

## 2023-08-27 NOTE — HISTORY OF PRESENT ILLNESS
[FreeTextEntry1] : Mr. MAGED DEL CID returns to the office for follow-up and his prior history and physical have been reviewed and he reports no change since last visit.  He initially came in for the evaluation of chronic cervical/lumbar radiculopathy and had done 3 weeks of physical therapy with minimal relief.  He did have EMG nerve conduction study of the extremities which show evidence of bilateral carpal tunnel left greater than right as well as polyneuropathy.  He is not known to be diabetic

## 2023-08-27 NOTE — ASSESSMENT
[FreeTextEntry1] : Cervical/lumbar radiculopathy – Continue with physical therapy, -MRI of the cervical/lumbar spine  Polyneuropathy – Etiology unclear – Blood work  Bilateral carpal tunnel syndrome – Occupational Therapy – If not better, she will see hand surgery for steroid injections or carpal tunnel release surgery  Medication side effect – We will switch gabapentin to low-dose Lyrica, but I warned him about the potential drowsiness side effect with Lyrica as well and he reported understanding  - Continue Physical Therapy for the Neck & Back.  - I Will Follow up with him In One Month.       I, Deidre Massey, Attest that this documentation has been prepared under the direction and in the presence of Provider Luther Holland DO  Thank You for letting me assist in the management of this patient.   Luther Holland DO Board Certified, Neurology

## 2023-10-10 ENCOUNTER — APPOINTMENT (OUTPATIENT)
Dept: NEUROLOGY | Facility: CLINIC | Age: 58
End: 2023-10-10
Payer: COMMERCIAL

## 2023-10-10 PROCEDURE — 99214 OFFICE O/P EST MOD 30 MIN: CPT

## 2023-10-10 RX ORDER — CYANOCOBALAMIN (VITAMIN B-12) 1000 MCG
1000 TABLET ORAL
Qty: 30 | Refills: 5 | Status: ACTIVE | COMMUNITY
Start: 2023-10-10 | End: 1900-01-01

## 2023-11-02 ENCOUNTER — APPOINTMENT (OUTPATIENT)
Dept: MRI IMAGING | Facility: CLINIC | Age: 58
End: 2023-11-02
Payer: COMMERCIAL

## 2023-11-02 PROCEDURE — 72141 MRI NECK SPINE W/O DYE: CPT

## 2023-11-02 PROCEDURE — 72148 MRI LUMBAR SPINE W/O DYE: CPT

## 2023-11-09 ENCOUNTER — APPOINTMENT (OUTPATIENT)
Dept: PAIN MANAGEMENT | Facility: CLINIC | Age: 58
End: 2023-11-09

## 2023-11-16 ENCOUNTER — APPOINTMENT (OUTPATIENT)
Dept: PAIN MANAGEMENT | Facility: CLINIC | Age: 58
End: 2023-11-16

## 2023-12-05 ENCOUNTER — APPOINTMENT (OUTPATIENT)
Dept: PAIN MANAGEMENT | Facility: CLINIC | Age: 58
End: 2023-12-05
Payer: COMMERCIAL

## 2023-12-05 PROCEDURE — 99203 OFFICE O/P NEW LOW 30 MIN: CPT

## 2023-12-12 ENCOUNTER — APPOINTMENT (OUTPATIENT)
Dept: NEUROLOGY | Facility: CLINIC | Age: 58
End: 2023-12-12

## 2023-12-12 RX ORDER — PREGABALIN 50 MG/1
50 CAPSULE ORAL
Qty: 14 | Refills: 0 | Status: ACTIVE | COMMUNITY
Start: 2023-08-27 | End: 1900-01-01

## 2023-12-19 ENCOUNTER — APPOINTMENT (OUTPATIENT)
Dept: NEUROLOGY | Facility: CLINIC | Age: 58
End: 2023-12-19
Payer: COMMERCIAL

## 2023-12-19 VITALS — BODY MASS INDEX: 39.37 KG/M2 | HEIGHT: 66 IN | WEIGHT: 245 LBS

## 2023-12-19 DIAGNOSIS — T88.7XXA UNSPECIFIED ADVERSE EFFECT OF DRUG OR MEDICAMENT, INITIAL ENCOUNTER: ICD-10-CM

## 2023-12-19 DIAGNOSIS — G56.03 CARPAL TUNNEL SYNDROM,BILATERAL UPPER LIMBS: ICD-10-CM

## 2023-12-19 PROCEDURE — 99214 OFFICE O/P EST MOD 30 MIN: CPT

## 2023-12-20 ENCOUNTER — EMERGENCY (EMERGENCY)
Facility: HOSPITAL | Age: 58
LOS: 0 days | Discharge: HOME | End: 2023-12-20
Attending: EMERGENCY MEDICINE
Payer: COMMERCIAL

## 2023-12-20 VITALS
TEMPERATURE: 98 F | RESPIRATION RATE: 18 BRPM | SYSTOLIC BLOOD PRESSURE: 209 MMHG | OXYGEN SATURATION: 99 % | DIASTOLIC BLOOD PRESSURE: 90 MMHG | HEART RATE: 75 BPM

## 2023-12-20 DIAGNOSIS — I10 ESSENTIAL (PRIMARY) HYPERTENSION: ICD-10-CM

## 2023-12-20 DIAGNOSIS — M25.532 PAIN IN LEFT WRIST: ICD-10-CM

## 2023-12-20 DIAGNOSIS — M25.512 PAIN IN LEFT SHOULDER: ICD-10-CM

## 2023-12-20 DIAGNOSIS — R51.9 HEADACHE, UNSPECIFIED: ICD-10-CM

## 2023-12-20 DIAGNOSIS — S00.03XA CONTUSION OF SCALP, INITIAL ENCOUNTER: ICD-10-CM
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
## 2023-12-20 DIAGNOSIS — M25.561 PAIN IN RIGHT KNEE: ICD-10-CM

## 2023-12-20 DIAGNOSIS — I25.2 OLD MYOCARDIAL INFARCTION: ICD-10-CM

## 2023-12-20 DIAGNOSIS — Z87.891 PERSONAL HISTORY OF NICOTINE DEPENDENCE: ICD-10-CM

## 2023-12-20 DIAGNOSIS — E78.5 HYPERLIPIDEMIA, UNSPECIFIED: ICD-10-CM

## 2023-12-20 DIAGNOSIS — R22.0 LOCALIZED SWELLING, MASS AND LUMP, HEAD: ICD-10-CM
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
## 2023-12-20 DIAGNOSIS — Z95.5 PRESENCE OF CORONARY ANGIOPLASTY IMPLANT AND GRAFT: ICD-10-CM

## 2023-12-20 DIAGNOSIS — I25.10 ATHEROSCLEROTIC HEART DISEASE OF NATIVE CORONARY ARTERY WITHOUT ANGINA PECTORIS: ICD-10-CM

## 2023-12-20 PROBLEM — G56.03 BILATERAL CARPAL TUNNEL SYNDROME: Status: ACTIVE | Noted: 2023-06-27

## 2023-12-20 PROBLEM — T88.7XXA MEDICATION SIDE EFFECT: Status: ACTIVE | Noted: 2023-08-27

## 2023-12-20 LAB
ALBUMIN SERPL ELPH-MCNC: 4.9 G/DL — SIGNIFICANT CHANGE UP (ref 3.5–5.2)
ALBUMIN SERPL ELPH-MCNC: 4.9 G/DL — SIGNIFICANT CHANGE UP (ref 3.5–5.2)
ALP SERPL-CCNC: 84 U/L — SIGNIFICANT CHANGE UP (ref 30–115)
ALP SERPL-CCNC: 84 U/L — SIGNIFICANT CHANGE UP (ref 30–115)
ALT FLD-CCNC: 17 U/L — SIGNIFICANT CHANGE UP (ref 0–41)
ALT FLD-CCNC: 17 U/L — SIGNIFICANT CHANGE UP (ref 0–41)
ANION GAP SERPL CALC-SCNC: 14 MMOL/L — SIGNIFICANT CHANGE UP (ref 7–14)
ANION GAP SERPL CALC-SCNC: 14 MMOL/L — SIGNIFICANT CHANGE UP (ref 7–14)
APTT BLD: 33.6 SEC — SIGNIFICANT CHANGE UP (ref 27–39.2)
APTT BLD: 33.6 SEC — SIGNIFICANT CHANGE UP (ref 27–39.2)
AST SERPL-CCNC: 23 U/L — SIGNIFICANT CHANGE UP (ref 0–41)
AST SERPL-CCNC: 23 U/L — SIGNIFICANT CHANGE UP (ref 0–41)
BASOPHILS # BLD AUTO: 0.06 K/UL — SIGNIFICANT CHANGE UP (ref 0–0.2)
BASOPHILS # BLD AUTO: 0.06 K/UL — SIGNIFICANT CHANGE UP (ref 0–0.2)
BASOPHILS NFR BLD AUTO: 0.5 % — SIGNIFICANT CHANGE UP (ref 0–1)
BASOPHILS NFR BLD AUTO: 0.5 % — SIGNIFICANT CHANGE UP (ref 0–1)
BILIRUB SERPL-MCNC: 0.7 MG/DL — SIGNIFICANT CHANGE UP (ref 0.2–1.2)
BILIRUB SERPL-MCNC: 0.7 MG/DL — SIGNIFICANT CHANGE UP (ref 0.2–1.2)
BUN SERPL-MCNC: 22 MG/DL — HIGH (ref 10–20)
BUN SERPL-MCNC: 22 MG/DL — HIGH (ref 10–20)
CALCIUM SERPL-MCNC: 9.7 MG/DL — SIGNIFICANT CHANGE UP (ref 8.4–10.4)
CALCIUM SERPL-MCNC: 9.7 MG/DL — SIGNIFICANT CHANGE UP (ref 8.4–10.4)
CHLORIDE SERPL-SCNC: 104 MMOL/L — SIGNIFICANT CHANGE UP (ref 98–110)
CHLORIDE SERPL-SCNC: 104 MMOL/L — SIGNIFICANT CHANGE UP (ref 98–110)
CO2 SERPL-SCNC: 21 MMOL/L — SIGNIFICANT CHANGE UP (ref 17–32)
CO2 SERPL-SCNC: 21 MMOL/L — SIGNIFICANT CHANGE UP (ref 17–32)
CREAT SERPL-MCNC: 0.9 MG/DL — SIGNIFICANT CHANGE UP (ref 0.7–1.5)
CREAT SERPL-MCNC: 0.9 MG/DL — SIGNIFICANT CHANGE UP (ref 0.7–1.5)
EGFR: 99 ML/MIN/1.73M2 — SIGNIFICANT CHANGE UP
EGFR: 99 ML/MIN/1.73M2 — SIGNIFICANT CHANGE UP
EOSINOPHIL # BLD AUTO: 0.37 K/UL — SIGNIFICANT CHANGE UP (ref 0–0.7)
EOSINOPHIL # BLD AUTO: 0.37 K/UL — SIGNIFICANT CHANGE UP (ref 0–0.7)
EOSINOPHIL NFR BLD AUTO: 3 % — SIGNIFICANT CHANGE UP (ref 0–8)
EOSINOPHIL NFR BLD AUTO: 3 % — SIGNIFICANT CHANGE UP (ref 0–8)
GLUCOSE SERPL-MCNC: 122 MG/DL — HIGH (ref 70–99)
GLUCOSE SERPL-MCNC: 122 MG/DL — HIGH (ref 70–99)
HCT VFR BLD CALC: 47.7 % — SIGNIFICANT CHANGE UP (ref 42–52)
HCT VFR BLD CALC: 47.7 % — SIGNIFICANT CHANGE UP (ref 42–52)
HGB BLD-MCNC: 15.3 G/DL — SIGNIFICANT CHANGE UP (ref 14–18)
HGB BLD-MCNC: 15.3 G/DL — SIGNIFICANT CHANGE UP (ref 14–18)
IMM GRANULOCYTES NFR BLD AUTO: 0.2 % — SIGNIFICANT CHANGE UP (ref 0.1–0.3)
IMM GRANULOCYTES NFR BLD AUTO: 0.2 % — SIGNIFICANT CHANGE UP (ref 0.1–0.3)
INR BLD: 0.9 RATIO — SIGNIFICANT CHANGE UP (ref 0.65–1.3)
INR BLD: 0.9 RATIO — SIGNIFICANT CHANGE UP (ref 0.65–1.3)
LYMPHOCYTES # BLD AUTO: 16.8 % — LOW (ref 20.5–51.1)
LYMPHOCYTES # BLD AUTO: 16.8 % — LOW (ref 20.5–51.1)
LYMPHOCYTES # BLD AUTO: 2.09 K/UL — SIGNIFICANT CHANGE UP (ref 1.2–3.4)
LYMPHOCYTES # BLD AUTO: 2.09 K/UL — SIGNIFICANT CHANGE UP (ref 1.2–3.4)
MCHC RBC-ENTMCNC: 27.5 PG — SIGNIFICANT CHANGE UP (ref 27–31)
MCHC RBC-ENTMCNC: 27.5 PG — SIGNIFICANT CHANGE UP (ref 27–31)
MCHC RBC-ENTMCNC: 32.1 G/DL — SIGNIFICANT CHANGE UP (ref 32–37)
MCHC RBC-ENTMCNC: 32.1 G/DL — SIGNIFICANT CHANGE UP (ref 32–37)
MCV RBC AUTO: 85.8 FL — SIGNIFICANT CHANGE UP (ref 80–94)
MCV RBC AUTO: 85.8 FL — SIGNIFICANT CHANGE UP (ref 80–94)
MONOCYTES # BLD AUTO: 1.21 K/UL — HIGH (ref 0.1–0.6)
MONOCYTES # BLD AUTO: 1.21 K/UL — HIGH (ref 0.1–0.6)
MONOCYTES NFR BLD AUTO: 9.7 % — HIGH (ref 1.7–9.3)
MONOCYTES NFR BLD AUTO: 9.7 % — HIGH (ref 1.7–9.3)
NEUTROPHILS # BLD AUTO: 8.67 K/UL — HIGH (ref 1.4–6.5)
NEUTROPHILS # BLD AUTO: 8.67 K/UL — HIGH (ref 1.4–6.5)
NEUTROPHILS NFR BLD AUTO: 69.8 % — SIGNIFICANT CHANGE UP (ref 42.2–75.2)
NEUTROPHILS NFR BLD AUTO: 69.8 % — SIGNIFICANT CHANGE UP (ref 42.2–75.2)
NRBC # BLD: 0 /100 WBCS — SIGNIFICANT CHANGE UP (ref 0–0)
NRBC # BLD: 0 /100 WBCS — SIGNIFICANT CHANGE UP (ref 0–0)
PLATELET # BLD AUTO: 296 K/UL — SIGNIFICANT CHANGE UP (ref 130–400)
PLATELET # BLD AUTO: 296 K/UL — SIGNIFICANT CHANGE UP (ref 130–400)
PMV BLD: 10.4 FL — SIGNIFICANT CHANGE UP (ref 7.4–10.4)
PMV BLD: 10.4 FL — SIGNIFICANT CHANGE UP (ref 7.4–10.4)
POTASSIUM SERPL-MCNC: 4.4 MMOL/L — SIGNIFICANT CHANGE UP (ref 3.5–5)
POTASSIUM SERPL-MCNC: 4.4 MMOL/L — SIGNIFICANT CHANGE UP (ref 3.5–5)
POTASSIUM SERPL-SCNC: 4.4 MMOL/L — SIGNIFICANT CHANGE UP (ref 3.5–5)
POTASSIUM SERPL-SCNC: 4.4 MMOL/L — SIGNIFICANT CHANGE UP (ref 3.5–5)
PROT SERPL-MCNC: 7.8 G/DL — SIGNIFICANT CHANGE UP (ref 6–8)
PROT SERPL-MCNC: 7.8 G/DL — SIGNIFICANT CHANGE UP (ref 6–8)
PROTHROM AB SERPL-ACNC: 10.2 SEC — SIGNIFICANT CHANGE UP (ref 9.95–12.87)
PROTHROM AB SERPL-ACNC: 10.2 SEC — SIGNIFICANT CHANGE UP (ref 9.95–12.87)
RBC # BLD: 5.56 M/UL — SIGNIFICANT CHANGE UP (ref 4.7–6.1)
RBC # BLD: 5.56 M/UL — SIGNIFICANT CHANGE UP (ref 4.7–6.1)
RBC # FLD: 13.5 % — SIGNIFICANT CHANGE UP (ref 11.5–14.5)
RBC # FLD: 13.5 % — SIGNIFICANT CHANGE UP (ref 11.5–14.5)
SODIUM SERPL-SCNC: 139 MMOL/L — SIGNIFICANT CHANGE UP (ref 135–146)
SODIUM SERPL-SCNC: 139 MMOL/L — SIGNIFICANT CHANGE UP (ref 135–146)
WBC # BLD: 12.43 K/UL — HIGH (ref 4.8–10.8)
WBC # BLD: 12.43 K/UL — HIGH (ref 4.8–10.8)
WBC # FLD AUTO: 12.43 K/UL — HIGH (ref 4.8–10.8)
WBC # FLD AUTO: 12.43 K/UL — HIGH (ref 4.8–10.8)

## 2023-12-20 PROCEDURE — 85730 THROMBOPLASTIN TIME PARTIAL: CPT

## 2023-12-20 PROCEDURE — 73110 X-RAY EXAM OF WRIST: CPT | Mod: LT

## 2023-12-20 PROCEDURE — 85025 COMPLETE CBC W/AUTO DIFF WBC: CPT

## 2023-12-20 PROCEDURE — 73030 X-RAY EXAM OF SHOULDER: CPT | Mod: 26,LT

## 2023-12-20 PROCEDURE — 74177 CT ABD & PELVIS W/CONTRAST: CPT | Mod: 26,MA

## 2023-12-20 PROCEDURE — 71260 CT THORAX DX C+: CPT | Mod: MA

## 2023-12-20 PROCEDURE — 36415 COLL VENOUS BLD VENIPUNCTURE: CPT

## 2023-12-20 PROCEDURE — 73030 X-RAY EXAM OF SHOULDER: CPT | Mod: LT

## 2023-12-20 PROCEDURE — 70450 CT HEAD/BRAIN W/O DYE: CPT | Mod: 26,MA

## 2023-12-20 PROCEDURE — 73130 X-RAY EXAM OF HAND: CPT | Mod: LT

## 2023-12-20 PROCEDURE — 72125 CT NECK SPINE W/O DYE: CPT | Mod: MA

## 2023-12-20 PROCEDURE — 73130 X-RAY EXAM OF HAND: CPT | Mod: 26,LT

## 2023-12-20 PROCEDURE — 73562 X-RAY EXAM OF KNEE 3: CPT | Mod: RT

## 2023-12-20 PROCEDURE — 80053 COMPREHEN METABOLIC PANEL: CPT

## 2023-12-20 PROCEDURE — 70450 CT HEAD/BRAIN W/O DYE: CPT | Mod: MA

## 2023-12-20 PROCEDURE — 74177 CT ABD & PELVIS W/CONTRAST: CPT | Mod: MA

## 2023-12-20 PROCEDURE — 73110 X-RAY EXAM OF WRIST: CPT | Mod: 26,LT

## 2023-12-20 PROCEDURE — 99284 EMERGENCY DEPT VISIT MOD MDM: CPT | Mod: 25

## 2023-12-20 PROCEDURE — 72125 CT NECK SPINE W/O DYE: CPT | Mod: 26,MA

## 2023-12-20 PROCEDURE — 99285 EMERGENCY DEPT VISIT HI MDM: CPT

## 2023-12-20 PROCEDURE — 85610 PROTHROMBIN TIME: CPT

## 2023-12-20 PROCEDURE — 71260 CT THORAX DX C+: CPT | Mod: 26,MA

## 2023-12-20 PROCEDURE — 73562 X-RAY EXAM OF KNEE 3: CPT | Mod: 26,RT

## 2023-12-20 RX ADMIN — Medication 100 MILLIGRAM(S): at 03:30

## 2023-12-20 NOTE — ED ADULT NURSE NOTE - NSICDXFAMILYHX_GEN_ALL_CORE_FT
FAMILY HISTORY:  Mother  Still living? Unknown  Family history of cardiac arrest, Age at diagnosis: Age Unknown    Sibling  Still living? Unknown  Family history of cardiac arrest, Age at diagnosis: Age Unknown

## 2023-12-20 NOTE — ED PROVIDER NOTE - PHYSICAL EXAMINATION
Physical Exam    Constitutional: No acute distress.   Head: some soft tissue swelling posterior scalp no abrasions, lacerations or step offs  Eyes: Conjunctiva pink, Sclera clear, PERRLA, EOMI.  ENT: No sinus tenderness. No nasal discharge. No oropharyngeal erythema, edema, or exudates. Uvula midline.   Cardiovascular: Regular rate, regular rhythm. No noted murmurs rubs or gallops   Respiratory: unlabored respiratory effort, clear to auscultation bilaterally no wheezing, rales or rhonchi  Gastrointestinal: Normal bowel sounds. soft, non distended, non-tender abdomen.   Musculoskeletal: supple neck, no midline tenderness. left anterior shoulder tenderness full ROM. Tenderness to left wrist with +tinels   Integumentary: warm, dry, no rash  Neurologic: awake, alert, cranial nerves II-XII grossly intact, extremities’ motor and sensory functions grossly intact

## 2023-12-20 NOTE — ED PROVIDER NOTE - NSFOLLOWUPINSTRUCTIONS_ED_ALL_ED_FT
Follow up with your pain management Doctor outpatient. Return to the ED if you develop any new or concerning symptoms.         Fall Prevention in the Home, Adult  Falls can cause injuries and can affect people from all age groups. There are many simple things that you can do to make your home safe and to help prevent falls. Ask for help when making these changes, if needed.    What actions can I take to prevent falls?  General instructions     Use good lighting in all rooms. Replace any light bulbs that burn out.  Turn on lights if it is dark. Use night-lights.  Place frequently used items in easy-to-reach places. Lower the shelves around your home if necessary.  Set up furniture so that there are clear paths around it. Avoid moving your furniture around.  Remove throw rugs and other tripping hazards from the floor.  Avoid walking on wet floors.  Fix any uneven floor surfaces.  Add color or contrast paint or tape to grab bars and handrails in your home. Place contrasting color strips on the first and last steps of stairways.  When you use a stepladder, make sure that it is completely opened and that the sides are firmly locked. Have someone hold the ladder while you are using it. Do not climb a closed stepladder.  Be aware of any and all pets.  What can I do in the bathroom?     Keep the floor dry. Immediately clean up any water that spills onto the floor.  Remove soap buildup in the tub or shower on a regular basis.  Use non-skid mats or decals on the floor of the tub or shower.  Attach bath mats securely with double-sided, non-slip rug tape.  If you need to sit down while you are in the shower, use a plastic, non-slip stool.  Image ImageInstall grab bars by the toilet and in the tub and shower. Do not use towel bars as grab bars.  What can I do in the bedroom?     Make sure that a bedside light is easy to reach.  Do not use oversized bedding that drapes onto the floor.  Have a firm chair that has side arms to use for getting dressed.  What can I do in the kitchen?     Clean up any spills right away.  If you need to reach for something above you, use a sturdy step stool that has a grab bar.  Keep electrical cables out of the way.  Do not use floor polish or wax that makes floors slippery. If you must use wax, make sure that it is non-skid floor wax.  What can I do in the stairways?     Do not leave any items on the stairs.  Make sure that you have a light switch at the top of the stairs and the bottom of the stairs. Have them installed if you do not have them.  Make sure that there are handrails on both sides of the stairs. Fix handrails that are broken or loose. Make sure that handrails are as long as the stairways.  Install non-slip stair treads on all stairs in your home.  Avoid having throw rugs at the top or bottom of stairways, or secure the rugs with carpet tape to prevent them from moving.  Choose a carpet design that does not hide the edge of steps on the stairway.  Check any carpeting to make sure that it is firmly attached to the stairs. Fix any carpet that is loose or worn.  What can I do on the outside of my home?     Use bright outdoor lighting.  Regularly repair the edges of walkways and driveways and fix any cracks.  Remove high doorway thresholds.  Trim any shrubbery on the main path into your home.  Regularly check that handrails are securely fastened and in good repair. Both sides of any steps should have handrails.  Install guardrails along the edges of any raised decks or porches.  Clear walkways of debris and clutter, including tools and rocks.  Have leaves, snow, and ice cleared regularly.  Use sand or salt on walkways during winter months.  In the garage, clean up any spills right away, including grease or oil spills.  What other actions can I take?     Wear closed-toe shoes that fit well and support your feet. Wear shoes that have rubber soles or low heels.  Use mobility aids as needed, such as canes, walkers, scooters, and crutches.  Review your medicines with your health care provider. Some medicines can cause dizziness or changes in blood pressure, which increase your risk of falling.  Talk with your health care provider about other ways that you can decrease your risk of falls. This may include working with a physical therapist or  to improve your strength, balance, and endurance.    Where to find more information  Centers for Disease Control and Prevention, [place]: https://www.cdc.gov  National [place] on Aging: https://yg1alua.[place]win.nih.gov  Contact a health care provider if:  You are afraid of falling at home.  You feel weak, drowsy, or dizzy at home.  You fall at home.  Summary  There are many simple things that you can do to make your home safe and to help prevent falls.  Ways to make your home safe include removing tripping hazards and installing grab bars in the bathroom.  Ask for help when making these changes in your home.  This information is not intended to replace advice given to you by your health care provider. Make sure you discuss any questions you have with your health care provider. Follow up with your pain management Doctor outpatient. Return to the ED if you develop any new or concerning symptoms.         Fall Prevention in the Home, Adult  Falls can cause injuries and can affect people from all age groups. There are many simple things that you can do to make your home safe and to help prevent falls. Ask for help when making these changes, if needed.    What actions can I take to prevent falls?  General instructions     Use good lighting in all rooms. Replace any light bulbs that burn out.  Turn on lights if it is dark. Use night-lights.  Place frequently used items in easy-to-reach places. Lower the shelves around your home if necessary.  Set up furniture so that there are clear paths around it. Avoid moving your furniture around.  Remove throw rugs and other tripping hazards from the floor.  Avoid walking on wet floors.  Fix any uneven floor surfaces.  Add color or contrast paint or tape to grab bars and handrails in your home. Place contrasting color strips on the first and last steps of stairways.  When you use a stepladder, make sure that it is completely opened and that the sides are firmly locked. Have someone hold the ladder while you are using it. Do not climb a closed stepladder.  Be aware of any and all pets.  What can I do in the bathroom?     Keep the floor dry. Immediately clean up any water that spills onto the floor.  Remove soap buildup in the tub or shower on a regular basis.  Use non-skid mats or decals on the floor of the tub or shower.  Attach bath mats securely with double-sided, non-slip rug tape.  If you need to sit down while you are in the shower, use a plastic, non-slip stool.  Image ImageInstall grab bars by the toilet and in the tub and shower. Do not use towel bars as grab bars.  What can I do in the bedroom?     Make sure that a bedside light is easy to reach.  Do not use oversized bedding that drapes onto the floor.  Have a firm chair that has side arms to use for getting dressed.  What can I do in the kitchen?     Clean up any spills right away.  If you need to reach for something above you, use a sturdy step stool that has a grab bar.  Keep electrical cables out of the way.  Do not use floor polish or wax that makes floors slippery. If you must use wax, make sure that it is non-skid floor wax.  What can I do in the stairways?     Do not leave any items on the stairs.  Make sure that you have a light switch at the top of the stairs and the bottom of the stairs. Have them installed if you do not have them.  Make sure that there are handrails on both sides of the stairs. Fix handrails that are broken or loose. Make sure that handrails are as long as the stairways.  Install non-slip stair treads on all stairs in your home.  Avoid having throw rugs at the top or bottom of stairways, or secure the rugs with carpet tape to prevent them from moving.  Choose a carpet design that does not hide the edge of steps on the stairway.  Check any carpeting to make sure that it is firmly attached to the stairs. Fix any carpet that is loose or worn.  What can I do on the outside of my home?     Use bright outdoor lighting.  Regularly repair the edges of walkways and driveways and fix any cracks.  Remove high doorway thresholds.  Trim any shrubbery on the main path into your home.  Regularly check that handrails are securely fastened and in good repair. Both sides of any steps should have handrails.  Install guardrails along the edges of any raised decks or porches.  Clear walkways of debris and clutter, including tools and rocks.  Have leaves, snow, and ice cleared regularly.  Use sand or salt on walkways during winter months.  In the garage, clean up any spills right away, including grease or oil spills.  What other actions can I take?     Wear closed-toe shoes that fit well and support your feet. Wear shoes that have rubber soles or low heels.  Use mobility aids as needed, such as canes, walkers, scooters, and crutches.  Review your medicines with your health care provider. Some medicines can cause dizziness or changes in blood pressure, which increase your risk of falling.  Talk with your health care provider about other ways that you can decrease your risk of falls. This may include working with a physical therapist or  to improve your strength, balance, and endurance.    Where to find more information  Centers for Disease Control and Prevention, [place]: https://www.cdc.gov  [employer] [place] on Aging: https://rz5pmtw.edwin.nih.gov  Contact a health care provider if:  You are afraid of falling at home.  You feel weak, drowsy, or dizzy at home.  You fall at home.  Summary  There are many simple things that you can do to make your home safe and to help prevent falls.  Ways to make your home safe include removing tripping hazards and installing grab bars in the bathroom.  Ask for help when making these changes in your home.  This information is not intended to replace advice given to you by your health care provider. Make sure you discuss any questions you have with your health care provider.

## 2023-12-20 NOTE — ED ADULT NURSE NOTE - NSICDXPASTMEDICALHX_GEN_ALL_CORE_FT
1. Follow-up with Orthopedics, See referred doctor. Call today / next business day for close, prompt follow-up.  2. Return to Emergency room for any worsening or persistent pain, weakness, numbness, fever, color change to extremity, or any other concerning symptoms.  3.  See attached instruction sheets for additional information, including information regarding signs and symptoms to look out for, reasons to seek immediate care and other important instructions.  4.   Rest, Ice hip as needed.     5.  Use crutches as discussed
PAST MEDICAL HISTORY:  GERD (gastroesophageal reflux disease)     H/O right coronary artery stent placement     HTN (hypertension)     Myocardial infarction involving right coronary artery, unspecified MI type

## 2023-12-20 NOTE — ED PROVIDER NOTE - OBJECTIVE STATEMENT
58 year old male with a history of STEMI 11/19 s/p PCI p/dRCA SANG x3 on Brilinta, smoker (quit 11/19)HLD, HTN, mitral insuff, cervical disc herniations, carpal tunnel presents to the ED for evaluation after fall. Patient reports that he fell down the stairs around 7pm. He states that his right knee buckled causing him to fall down 10 stairs in his home. He hit his head on the stairs now has pain in the back of his head but denies vision changes and dizziness. Reports left shoulder pain, pain in left wrist traveling up his arm, and right knee pain. Denies fever, chills, chest pain, shortness of breath, abdominal pain, nausea, vomiting, diarrhea, constipation, dysuria, hematuria, lower extremity swelling, rash.

## 2023-12-20 NOTE — ED ADULT NURSE NOTE - OBJECTIVE STATEMENT
58yom c/o fall. Pt c/o pain in left arm. Pt denies n/v, dizziness, sob, head trauma, changes in loc.

## 2023-12-20 NOTE — ED PROVIDER NOTE - PROGRESS NOTE DETAILS
Wrist XR appears negative. Offered splint however patient refusing at this time because he states that braces/splints aggravate carpal tunnel. cleared for DC otherwise

## 2023-12-20 NOTE — HISTORY OF PRESENT ILLNESS
[FreeTextEntry1] : Mr. MAGED DEL CID returns to the office for follow-up and his prior history and physical have been reviewed and he reports no change since last visit.  he has been seeing us for chronic cervical/lumbar radiculopathy, bilateral carpal tunnel syndrome, polyneuropathy, low B12 levels.  Despite physical therapy his neck pain and back pain has not gotten better and as the weather gets colder his pain in the neck and back as well as various joints started to hurt more.  He is under the care of of pain management and has injections scheduled.  He was initially given gabapentin which he has significant side effect to therefore it was switched over to low-dose pregabalin which he was fine with however it has not been working sufficiently.  Pain management recommended increasing the dose.  Because of patient's job he is not able to take any medication at work so he takes it twice a day only.  In terms of his bilateral carpal tunnel syndrome, hand surgery was recommended however he opted to just use the hand splint that was recommended by pain management.  Lastly he has been taking B12 supplements for his low B12 level

## 2023-12-20 NOTE — ED ADULT NURSE NOTE - NSFALLRISKINTERV_ED_ALL_ED
Communicate fall risk and risk factors to all staff, patient, and family/Provide visual cue: yellow wristband, yellow gown, etc/Reinforce activity limits and safety measures with patient and family/Call bell, personal items and telephone in reach/Instruct patient to call for assistance before getting out of bed/chair/stretcher/Non-slip footwear applied when patient is off stretcher/[place] to call system/Physically safe environment - no spills, clutter or unnecessary equipment/Purposeful Proactive Rounding/Room/bathroom lighting operational, light cord in reach Communicate fall risk and risk factors to all staff, patient, and family/Provide visual cue: yellow wristband, yellow gown, etc/Reinforce activity limits and safety measures with patient and family/Call bell, personal items and telephone in reach/Instruct patient to call for assistance before getting out of bed/chair/stretcher/Non-slip footwear applied when patient is off stretcher/[place] to call system/Physically safe environment - no spills, clutter or unnecessary equipment/Purposeful Proactive Rounding/Room/bathroom lighting operational, light cord in reach

## 2023-12-20 NOTE — ED PROVIDER NOTE - CLINICAL SUMMARY MEDICAL DECISION MAKING FREE TEXT BOX
59 yo M, hx of CAD on brilinta, HTN, HLD here for assessment sp mechanical trip and fall down 10 steps. Patient describes his knee giving out and him tumbling down the stairs. Notes headache and that he did hit his head on the way down, no LOC. Has L shoulder, L wrist, R knee pain.    VS notable for elevated BP -- on exam is well appearing, has contusion to scalp, non focal neuro exam, no midline CTL spine ttp, full ROM of extremities, good pulses.    Give mechanism, use of antiplatelet, pan CT scan done --- no acute truamatic injuries noted. Patient had XR without fracture, offered splinting of wrist due to pain but declined.    Advised on need for cognitive rest, possibility of concussion, sx monitoring, return precautions, follow up. 57 yo M, hx of CAD on brilinta, HTN, HLD here for assessment sp mechanical trip and fall down 10 steps. Patient describes his knee giving out and him tumbling down the stairs. Notes headache and that he did hit his head on the way down, no LOC. Has L shoulder, L wrist, R knee pain.    VS notable for elevated BP -- on exam is well appearing, has contusion to scalp, non focal neuro exam, no midline CTL spine ttp, full ROM of extremities, good pulses.    Give mechanism, use of antiplatelet, pan CT scan done --- no acute truamatic injuries noted. Patient had XR without fracture, offered splinting of wrist due to pain but declined.    Advised on need for cognitive rest, possibility of concussion, sx monitoring, return precautions, follow up.

## 2023-12-20 NOTE — ED PROVIDER NOTE - PATIENT PORTAL LINK FT
You can access the FollowMyHealth Patient Portal offered by [facility] by registering at the following website: [email or web address]. By joining [employer]’s FollowMyHealth portal, you will also be able to view your health information using other applications (apps) compatible with our system. You can access the FollowMyHealth Patient Portal offered by [facility] by registering at the following website: [email or web address]. By joining [facility]’s FollowMyHealth portal, you will also be able to view your health information using other applications (apps) compatible with our system.

## 2023-12-20 NOTE — ED PROVIDER NOTE - NSICDXPASTMEDICALHX_GEN_ALL_CORE_FT
PAST MEDICAL HISTORY:  GERD (gastroesophageal reflux disease)     H/O right coronary artery stent placement     HTN (hypertension)     Myocardial infarction involving right coronary artery, unspecified MI type

## 2023-12-20 NOTE — PHYSICAL EXAM
[Person] : oriented to person [Place] : oriented to place [Time] : oriented to time [Concentration Intact] : normal concentrating ability [Visual Intact] : visual attention was ~T not ~L decreased [Naming Objects] : no difficulty naming common objects [Repeating Phrases] : no difficulty repeating a phrase [Writing A Sentence] : no difficulty writing a sentence [Fluency] : fluency intact [Comprehension] : comprehension intact [Reading] : reading intact [Past History] : adequate knowledge of personal past history [Cranial Nerves Optic (II)] : visual acuity intact bilaterally,  visual fields full to confrontation, pupils equal round and reactive to light [Cranial Nerves Oculomotor (III)] : extraocular motion intact [Cranial Nerves Trigeminal (V)] : facial sensation intact symmetrically [Cranial Nerves Facial (VII)] : face symmetrical [Cranial Nerves Vestibulocochlear (VIII)] : hearing was intact bilaterally [Cranial Nerves Glossopharyngeal (IX)] : tongue and palate midline [Cranial Nerves Accessory (XI - Cranial And Spinal)] : head turning and shoulder shrug symmetric [Cranial Nerves Hypoglossal (XII)] : there was no tongue deviation with protrusion [Motor Tone] : muscle tone was normal in all four extremities [Motor Strength] : muscle strength was normal in all four extremities [No Muscle Atrophy] : normal bulk in all four extremities [Past-pointing] : there was no past-pointing [Tremor] : no tremor present [2+] : Ankle jerk left 2+ [Plantar Reflex Right Only] : normal on the right [Plantar Reflex Left Only] : normal on the left [FreeTextEntry6] : Limited range of motion in the neck and back, tenderness to percussion in the paraspinal muscles [FreeTextEntry7] : Decree sensation distally, positive straight leg test bilaterally [FreeTextEntry8] : Positive Romberg, trouble with tandem, antalgic gait

## 2023-12-20 NOTE — ED ADULT TRIAGE NOTE - CHIEF COMPLAINT QUOTE
Pt presents to the ED w/ c/o left arm pain s/p fall down 10 steps 1900. PMHx 3 stents, carpal tunnel, nerve damage. HTN, herniated disk. Pt on Brilinta.

## 2023-12-20 NOTE — ASSESSMENT
[FreeTextEntry1] : Chronic cervical/lumbar radiculopathy-quite symptomatic - Lyrica to be increased up to 100 mg 3 tablets a day.  Patient advised to increase the dosage slowly to avoid  the drowsiness side effect, and he reported understanding -Follow-up with pain management for injections as schedule  Bilateral carpal tunnel syndrome-improved - Continue with wrist splints - Consider seeing orthopedics for steroid injections and if not better carpal tunnel release surgery  Polyneuropathy/low B12 level -Advised patient again to follow-up with his medical doctor regarding the abnormal blood work and whether he would need further hematological workup - Continue with B12 supplement, will recheck it next time and if still low, will recommend patient to get IM injections

## 2023-12-28 ENCOUNTER — APPOINTMENT (OUTPATIENT)
Dept: PAIN MANAGEMENT | Facility: CLINIC | Age: 58
End: 2023-12-28
Payer: COMMERCIAL

## 2023-12-28 ENCOUNTER — APPOINTMENT (OUTPATIENT)
Dept: PAIN MANAGEMENT | Facility: CLINIC | Age: 58
End: 2023-12-28

## 2023-12-28 PROCEDURE — 64494 INJ PARAVERT F JNT L/S 2 LEV: CPT | Mod: 50

## 2023-12-28 PROCEDURE — 64493 INJ PARAVERT F JNT L/S 1 LEV: CPT | Mod: 50

## 2023-12-29 NOTE — PROCEDURE
[FreeTextEntry3] : Date of Service: 12/28/2023   Account: [id number]  Patient: MAGED DEL CID   YOB: 1965  Age: 58 year  Surgeon:                  Abdon Paige DO  Assistant:                None  Pre-Operative Diagnosis:   Lumbar Spondylosis      Post Operative Diagnosis:  Lumbar Spondylosis  Procedure:             Bilateral  (L3, L4, L5 medial branch) L4-5, L5-S1 facet joint block under fluoroscopic guidance.  Anesthesia:            none  This procedure was carried out using fluoroscopic guidance.  The risks and benefits of the procedure were discussed extensively with the patient.  The consent of the patient was obtained and the following procedure was performed. The patient was placed in the prone position on the fluoroscopy table and the area was prepped and draped in a sterile fashion.  A timeout was performed with all essential staff present and the site and side were verified.  The lumbar vertebral bodies were identified and the fluoroscope was obliqued ipsilateral to approximately 30 degrees to reveal the appropriate anatomical view.  The junction of the superior articulate process and transverse process at the right L4, and L5 levels were identified and marked. A spinal needle was then introduced and advanced to the above target points at the junction of the SAP and transverse processes until bone was contacted.  Fluoroscope then focused on the right sacral ala on A/P view, and marked at this point.  A spinal needle was then advanced under fluoroscopic guidance until bone was contacted at the ala.    After negative aspiration for heme and CSF, an 1 cc of a mixture of 0.5% Marcaine and 10mg decadron was injected at each of the injection sites.  The procedure was performed in the exact same fashion on the contralateral left side at the L4, L5 and sacral ala levels.  The needles were subsequently removed.  Vital signs remained normal.  Pulse oximeter was used throughout the procedure and the patient's pulse and oxygen saturation remained within normal limits.  The patient tolerated the procedure well.  There were no complications.  The patient was instructed to apply ice over the injection sites for twenty minutes every two hours for the next 24 to 48 hours.  Disposition:       1. The patient was advised to F/U in 1-2 weeks to assess the response to the injection.       2. They were advised to keep a pain diary to report the results of the diagnostic block at their FUV.      3. The patient was also instructed to contact me immediately if there were any concerns related to the procedure performed.

## 2024-01-17 ENCOUNTER — APPOINTMENT (OUTPATIENT)
Dept: PAIN MANAGEMENT | Facility: CLINIC | Age: 59
End: 2024-01-17
Payer: COMMERCIAL

## 2024-01-17 DIAGNOSIS — G89.29 PAIN IN RIGHT KNEE: ICD-10-CM

## 2024-01-17 DIAGNOSIS — M25.562 PAIN IN RIGHT KNEE: ICD-10-CM

## 2024-01-17 DIAGNOSIS — M25.551 PAIN IN RIGHT HIP: ICD-10-CM

## 2024-01-17 DIAGNOSIS — G89.29 PAIN IN RIGHT HIP: ICD-10-CM

## 2024-01-17 DIAGNOSIS — M25.552 PAIN IN RIGHT HIP: ICD-10-CM

## 2024-01-17 DIAGNOSIS — M25.561 PAIN IN RIGHT KNEE: ICD-10-CM

## 2024-01-17 PROCEDURE — 99213 OFFICE O/P EST LOW 20 MIN: CPT

## 2024-01-18 PROBLEM — M25.551 CHRONIC PAIN OF BOTH HIPS: Status: ACTIVE | Noted: 2024-01-17

## 2024-01-18 PROBLEM — M25.561 CHRONIC PAIN OF BOTH KNEES: Status: ACTIVE | Noted: 2024-01-17

## 2024-01-18 NOTE — HISTORY OF PRESENT ILLNESS
[FreeTextEntry1] : Mr. Mart is a 58 year old male presenting to establish care for his neck and lower back pain. He is being referred by Dr. Holland, Neurology.   As for his neck pain, he notes pain in his neck and referred into the arms all the way into the hands. He states the pain is severe in the hands. He has significant numbness and tingling in the hands especially in the fingers. He notes at times trouble with grasping objects.   As for his lower back pain, he is presenting with mainly axial lower back pain. He states the pain is associated with stiffness and spasms in the back along with pain with any sort of rotational movements of the back. He states the pain is worse with getting up from a seated position. He denies any radicular component.   Of note, he is currently taking a blood thinner.   He states the pain started after he had his stents put in in 2019. Since then, the pain has been getting progressively worse. He states the pain is moderate/severe, rated at a 7/10 on the pain scale. On some days, he states the pain can be a 10/10 on the pain scale. He states the pain is intermittent and worsens in no typical pattern. He describes the pain as a sharp, burning, dull/aching and throbbing like sensation. She states the pain is increased with lying down, standing, sitting and walking. He states there is no change in pain with exercising, relaxing, coughing/sneezing and bowel movements. He can only sit for 1 hour before pain arises. He never has to lie down secondary to the pain. He utilizes no assisted walking device.   Prior pain treatments: No relief with physical therapy, heat and cold treatment.   Prior pain mediations: Tylenol. Currently taking Pregabalin 50 mg TID and Gabapentin 300 mg TID.   TODAY, 1-: Revisit encounter.   Since last visit, she underwent a bilateral L3, L4, L5 medial branch block on 12-. He does note approximately 90% sustained relief for 2-3 days. He states he had better mobility, increase in functionality as well as decrease in pain. He states his pain is returning but not as bad. He does have some continued stiffness in the back.   As for his neck pain, he states the pain is tolerable. He has been doing home exercises which has been helping with the pain.   As for his bilateral hand pain, he still continues with persistent hand pain. He started wearing a wrist splint which he is unsure if it is helping. He states he has trouble with grasping objects from time to time. With the colder weather, he notices an increase in pain. He has been hesitant to go see Dr. Almanza for a carpal tunnel release.   He is also presenting today with bilateral knee pain. He states the pain is worse with putting pressure over the knee joints. He states he cannot stand or walk up and down the stairs without pain. Pain is present daily and constant.   He does also have bilateral hip pain. He notes pain with rotational movements of the hips. Pain is made worse with any weight bearing or walking for extended periods of time. Pain is relieved with sitting or laying down at night.

## 2024-01-18 NOTE — PHYSICAL EXAM
[de-identified] :  Cervical Spine Exam:   Inspection:  erythema (-)   ecchymosis (-)  rashes (-)       Palpation:   Cervical paraspinal mm tenderness: R (-); L(-)  Upper trapezius mm tenderness: R (-); L (-)   Rhomboids tenderness: R (-); L (-)  Scalenes mm tenderness: R (-); L (-)  Occipital Ridge: R (-); L (-)  Supraspinatus mm tenderness: R (-); L (-)   ROM:  limited rom 2/2 to pain   Strength Testing:  Right Left  Deltoid (5/5) (5/5)  Biceps: (5/5) (5/5)   Triceps: (5/5) (5/5)   Finger Abductors: (5/5) (5/5)   Grasp: (5/5) (5/5)       Special Testing:  Spurling Test: R (-); L (-)  Facet load test: R (-); L (-)    Musculoskeletal: Shoulder Laterality Left   Inspection/palpation  No gross deformity, erythema, edema   There is tenderness over the anterior shoulder / proximal biceps tendon / AC joint / trapezius   ROM:  Forward flexion 170 (normal 170-180)   Internal rotation 70 (normal 90)  External rotation 60 (normal 60-70)   Adduction 15 (normal 0-30)   Abduction 100 (normal 180)  negative Supraspinatus weakness (Empty can test)  Osuna sign negative Three is a negative drop arm sign. Tinel sign positive on the right and left.    Lumbar Spine Exam: Inspection: erythema (-) ecchymosis (-) rashes (-) alignment: no scoliosis  Palpation: Midline lumbar tenderness:          L (-)   (-) paraspinal tenderness:                  L (+) ; R (+) sciatic nerve tenderness :             L (+) ; R (+) SI joint tenderness:                        L (-) ; R (-) GTB tenderness:                            L (-);  R (-)  Limited ROM 2/2 to pain with lumbar flexion and extension w/ rotation to R and L side  Strength: 5/5 throughout all muscle groups of the lower extremity                                     Right       Left    Hip Flexion:                (5/5)       (5/5) Quadriceps:               (5/5)       (5/5) Hamstrings:                (5/5)       (5/5) Ankle Dorsiflexion:     (5/5)       (5/5) EHL:                           (5/5)       (5/5) Ankle Plantarflexion:  (5/5)       (5/5) Special Tests: SLR:                           R (-) ; L (-) Facet loading:            R (+) ; L (+) QUINCY test:               R (-) ; L (-)  Neurologic: Light touch intact throughout LE  Reflexes normal and symmetric   Gait: mildly antalgic gait ambulates w/o assistive device  Knees: Inspection/palpation   Negative swelling, erythema, warmth  Negative tenderness to palpation   Negative crepitus    ROM:   Flexion 135 (normal 120-150)  Extension 0 (normal 0-15)   Tests:   Negative anterior drawer test   Negative Michelle's test  There is no varus or valgus instability  Quad strength is 5/5, hamstring strength is 5/5   Hips: Inspection: no evidence of atrophy, effusion, rash     Palpation: + tenderness over anterior hip, adductors, PSIS, gluteus, GTB    Stability: no gross instability bilaterally    Alignment: normal    ROM:   Painful reduced ROM especially flexion, external and internal rotation.      Special tests:  QUINCY + (groin pain)    Stability:  No gross instability bilaterally

## 2024-01-18 NOTE — DATA REVIEWED
[FreeTextEntry1] : MRI of the cervical spine taken on 11-2-2023 showed cervical spondylosis is present from C3-4 through C6-7 producing slight spinal cord compression at the C6-7 disc space level.   MRI of the lumbar spine taken on 11-2-2023 showed focal disc herniation l5-S1 to the right of midline approaching the exiting right S1 nerve root sleeve. Mild to moderate lumbar stenosis L4-5. Small annular bulge L3-4.

## 2024-02-20 ENCOUNTER — APPOINTMENT (OUTPATIENT)
Dept: NEUROLOGY | Facility: CLINIC | Age: 59
End: 2024-02-20

## 2024-02-28 ENCOUNTER — TRANSCRIPTION ENCOUNTER (OUTPATIENT)
Age: 59
End: 2024-02-28

## 2024-02-28 ENCOUNTER — APPOINTMENT (OUTPATIENT)
Dept: PAIN MANAGEMENT | Facility: CLINIC | Age: 59
End: 2024-02-28
Payer: COMMERCIAL

## 2024-02-28 PROCEDURE — 99213 OFFICE O/P EST LOW 20 MIN: CPT

## 2024-02-28 RX ORDER — METHOCARBAMOL 750 MG/1
750 TABLET, FILM COATED ORAL
Qty: 30 | Refills: 3 | Status: ACTIVE | COMMUNITY
Start: 2024-01-17 | End: 1900-01-01

## 2024-03-01 NOTE — HISTORY OF PRESENT ILLNESS
[FreeTextEntry1] : Mr. Mart is a 58 year old male presenting to establish care for his neck and lower back pain. He is being referred by Dr. Holland, Neurology.   As for his neck pain, he notes pain in his neck and referred into the arms all the way into the hands. He states the pain is severe in the hands. He has significant numbness and tingling in the hands especially in the fingers. He notes at times trouble with grasping objects.   As for his lower back pain, he is presenting with mainly axial lower back pain. He states the pain is associated with stiffness and spasms in the back along with pain with any sort of rotational movements of the back. He states the pain is worse with getting up from a seated position. He denies any radicular component.   Of note, he is currently taking a blood thinner.   He states the pain started after he had his stents put in in 2019. Since then, the pain has been getting progressively worse. He states the pain is moderate/severe, rated at a 7/10 on the pain scale. On some days, he states the pain can be a 10/10 on the pain scale. He states the pain is intermittent and worsens in no typical pattern. He describes the pain as a sharp, burning, dull/aching and throbbing like sensation. She states the pain is increased with lying down, standing, sitting and walking. He states there is no change in pain with exercising, relaxing, coughing/sneezing and bowel movements. He can only sit for 1 hour before pain arises. He never has to lie down secondary to the pain. He utilizes no assisted walking device.   Prior pain treatments: No relief with physical therapy, heat and cold treatment.   Prior pain mediations: Tylenol. Currently taking Pregabalin 50 mg TID and Gabapentin 300 mg TID.   TODAY, 2-: Revisit encounter.   As for his lower back pain, he states the pain continues to be tolerable. He states the pain is only flared up with aggressive physical activity.   As for his neck pain, he states the pain is starting to flare up. He states the pain is in the neck and refers into the bilateral upper extremities. He has numbness and tingling in the upper extremities all the way into the hands. The pain has been waxing and weening.   As for his bilateral hand pain, he states the pain has been improving. He started wearing a wrist splint which is helping. He can tolerate using tools a lot better.   He is also presenting today with bilateral knee pain. He states the pain is worse with putting pressure over the knee joints. He states he cannot stand or walk up and down the stairs without pain. Pain is present daily and constant.   He does also have bilateral hip pain. He notes pain with rotational movements of the hips. He does have a decrease in pain since he was last seen. He is able to sit for longer without excruciating pain. Pain is relieved with sitting or lying down at night.   He has been taking Methocarbamol 750 mg nightly which does provide him with 30-40% improvement in pain along with increase in function. He denies any side effects.

## 2024-03-01 NOTE — PHYSICAL EXAM
[de-identified] :  Cervical Spine Exam:   Inspection:  erythema (-)   ecchymosis (-)  rashes (-)       Palpation:   Cervical paraspinal mm tenderness: R (-); L(-)  Upper trapezius mm tenderness: R (-); L (-)   Rhomboids tenderness: R (-); L (-)  Scalenes mm tenderness: R (-); L (-)  Occipital Ridge: R (-); L (-)  Supraspinatus mm tenderness: R (-); L (-)   ROM:  limited rom 2/2 to pain   Strength Testing:  Right Left  Deltoid (5/5) (5/5)  Biceps: (5/5) (5/5)   Triceps: (5/5) (5/5)   Finger Abductors: (5/5) (5/5)   Grasp: (5/5) (5/5)       Special Testing:  Spurling Test: R (-); L (-)  Facet load test: R (-); L (-)    Musculoskeletal: Shoulder Laterality Left   Inspection/palpation  No gross deformity, erythema, edema   There is tenderness over the anterior shoulder / proximal biceps tendon / AC joint / trapezius   ROM:  Forward flexion 170 (normal 170-180)   Internal rotation 70 (normal 90)  External rotation 60 (normal 60-70)   Adduction 15 (normal 0-30)   Abduction 100 (normal 180)  negative Supraspinatus weakness (Empty can test)  Osuna sign negative Three is a negative drop arm sign. Tinel sign positive on the right and left.    Lumbar Spine Exam: Inspection: erythema (-) ecchymosis (-) rashes (-) alignment: no scoliosis  Palpation: Midline lumbar tenderness:          L (-)   (-) paraspinal tenderness:                  L (+) ; R (+) sciatic nerve tenderness :             L (+) ; R (+) SI joint tenderness:                        L (-) ; R (-) GTB tenderness:                            L (-);  R (-)  Limited ROM 2/2 to pain with lumbar flexion and extension w/ rotation to R and L side  Strength: 5/5 throughout all muscle groups of the lower extremity                                     Right       Left    Hip Flexion:                (5/5)       (5/5) Quadriceps:               (5/5)       (5/5) Hamstrings:                (5/5)       (5/5) Ankle Dorsiflexion:     (5/5)       (5/5) EHL:                           (5/5)       (5/5) Ankle Plantarflexion:  (5/5)       (5/5) Special Tests: SLR:                           R (-) ; L (-) Facet loading:            R (+) ; L (+) QUINCY test:               R (-) ; L (-)  Neurologic: Light touch intact throughout LE  Reflexes normal and symmetric   Gait: mildly antalgic gait ambulates w/o assistive device  Knees: Inspection/palpation   Negative swelling, erythema, warmth  Negative tenderness to palpation   Negative crepitus    ROM:   Flexion 135 (normal 120-150)  Extension 0 (normal 0-15)   Tests:   Negative anterior drawer test   Negative Michelle's test  There is no varus or valgus instability  Quad strength is 5/5, hamstring strength is 5/5   Hips: Inspection: no evidence of atrophy, effusion, rash     Palpation: + tenderness over anterior hip, adductors, PSIS, gluteus, GTB    Stability: no gross instability bilaterally    Alignment: normal    ROM:   Painful reduced ROM especially flexion, external and internal rotation.      Special tests:  QUINCY + (groin pain)    Stability:  No gross instability bilaterally

## 2024-03-01 NOTE — DISCUSSION/SUMMARY
[de-identified] : A discussion regarding available pain management treatment options occurred with the patient. These included interventional, rehabilitative, pharmacological, and alternative modalities. We will proceed with the following:   Interventional treatment options: 1. Proceed with a cervical epidural steroid injection.  Treatment options were discussed. The patient has been having persistent, severe neck pain and cervical radicular pain that has minimally improved with conservative therapy thus far (including physical therapy/chiropractic manipulations/home stretching and anti-inflammatory medications for 8 weeks. The patient was given the option of proceeding with a cervical epidural steroid injection to try to get the patient some pain relief.   The risks and benefits were discussed, which included the associated problems with steroids, bleeding, nerve injury, lack of improvement with pain, and 0.5% chance of a post dural puncture headache.   * He is currently on Brilinta. We will reach out to his cardiologist for clearance before proceeding with this injection.   - Continue with an active HEP.  Imagin. Ordered a bilateral knee X-ray due to pain and decrease in range of motion in that area to delineate a pain generator. 2. Ordered a bilateral hip/pelvis X-ray due to pain and decrease in range of motion in that area to delineate a pain generator.  Medications: 1. Continue with Methocarbamol 750 mg nightly.  We are prescribing a muscle relaxant medication to help the patient's muscle spasm pain. The patient understands to not take this medication before driving or operating any heavy machinery as it can be sedating.   - Follow up in 1 month.   I Marichuy Maravilla attest that this documentation has been prepared under the direction and in the presence of provider Dr. Abdon Paige.  The documentation recorded by the scribe in my presence, accurately reflects the service I personally performed, and the decisions made by me with my edits as appropriate.   Abdon Paige, DO

## 2024-03-02 ENCOUNTER — RESULT CHARGE (OUTPATIENT)
Age: 59
End: 2024-03-02

## 2024-03-02 ENCOUNTER — APPOINTMENT (OUTPATIENT)
Dept: RADIOLOGY | Facility: CLINIC | Age: 59
End: 2024-03-02
Payer: COMMERCIAL

## 2024-03-02 PROCEDURE — 73522 X-RAY EXAM HIPS BI 3-4 VIEWS: CPT

## 2024-03-02 PROCEDURE — 73562 X-RAY EXAM OF KNEE 3: CPT | Mod: 50

## 2024-04-04 ENCOUNTER — APPOINTMENT (OUTPATIENT)
Dept: NEUROLOGY | Facility: CLINIC | Age: 59
End: 2024-04-04
Payer: COMMERCIAL

## 2024-04-04 VITALS — WEIGHT: 245 LBS | HEIGHT: 66 IN | BODY MASS INDEX: 39.37 KG/M2

## 2024-04-04 DIAGNOSIS — M47.816 SPONDYLOSIS W/OUT MYELOPATHY OR RADICULOPATHY, LUMBAR REGION: ICD-10-CM

## 2024-04-04 DIAGNOSIS — R79.89 OTHER SPECIFIED ABNORMAL FINDINGS OF BLOOD CHEMISTRY: ICD-10-CM

## 2024-04-04 DIAGNOSIS — G62.9 POLYNEUROPATHY, UNSPECIFIED: ICD-10-CM

## 2024-04-04 DIAGNOSIS — M54.16 RADICULOPATHY, LUMBAR REGION: ICD-10-CM

## 2024-04-04 PROCEDURE — 99213 OFFICE O/P EST LOW 20 MIN: CPT

## 2024-04-04 RX ORDER — PREGABALIN 100 MG/1
100 CAPSULE ORAL 3 TIMES DAILY
Qty: 270 | Refills: 1 | Status: ACTIVE | COMMUNITY
Start: 2023-12-19 | End: 1900-01-01

## 2024-04-07 PROBLEM — M47.816 LUMBAR SPONDYLOSIS: Status: ACTIVE | Noted: 2023-12-05

## 2024-04-07 PROBLEM — R79.89 LOW VITAMIN B12 LEVEL: Status: ACTIVE | Noted: 2023-10-10

## 2024-04-07 PROBLEM — G62.9 POLYNEUROPATHY: Status: ACTIVE | Noted: 2023-08-25

## 2024-04-07 PROBLEM — M54.16 LUMBAR RADICULOPATHY, CHRONIC: Status: ACTIVE | Noted: 2023-06-27

## 2024-04-07 NOTE — HISTORY OF PRESENT ILLNESS
[FreeTextEntry1] : Mr. MAGED DEL CID returns to the office for follow-up and his prior history and physical have been reviewed and he reports no change since last visit.  He has been seeing us for chronic cervical/lumbar radiculopathy, bilateral carpal tunnel syndrome, polyneuropathy, and low B12 levels.  Despite physical therapy his neck pain and back pain has not gotten better.  He is under the care of pain management and has injections scheduled.  He was initially given gabapentin which he had significant side effect to and therefore was switched over to pregabalin. Patient has been taking pregabalin 100 mg up to 3 tabs a day. Because of patient's job he is not able to take any medication at work, so he takes it twice a day only.  Patient is here today for Pregabalin renewal. He denies any side effects to the medication.   In terms of his bilateral carpal tunnel syndrome, hand surgery was recommended however he opted to just use the hand splint that was recommended by pain management. Lastly, he has been taking B12 supplements for his low B12 level.

## 2024-04-07 NOTE — PHYSICAL EXAM
[Person] : oriented to person [Place] : oriented to place [Time] : oriented to time [Concentration Intact] : normal concentrating ability [Visual Intact] : visual attention was ~T not ~L decreased [Naming Objects] : no difficulty naming common objects [Repeating Phrases] : no difficulty repeating a phrase [Writing A Sentence] : no difficulty writing a sentence [Fluency] : fluency intact [Comprehension] : comprehension intact [Reading] : reading intact [Past History] : adequate knowledge of personal past history [Cranial Nerves Optic (II)] : visual acuity intact bilaterally,  visual fields full to confrontation, pupils equal round and reactive to light [Cranial Nerves Oculomotor (III)] : extraocular motion intact [Cranial Nerves Trigeminal (V)] : facial sensation intact symmetrically [Cranial Nerves Facial (VII)] : face symmetrical [Cranial Nerves Vestibulocochlear (VIII)] : hearing was intact bilaterally [Cranial Nerves Glossopharyngeal (IX)] : tongue and palate midline [Cranial Nerves Accessory (XI - Cranial And Spinal)] : head turning and shoulder shrug symmetric [Cranial Nerves Hypoglossal (XII)] : there was no tongue deviation with protrusion [Motor Tone] : muscle tone was normal in all four extremities [Motor Strength] : muscle strength was normal in all four extremities [No Muscle Atrophy] : normal bulk in all four extremities [2+] : Ankle jerk left 2+ [Past-pointing] : there was no past-pointing [Tremor] : no tremor present [Plantar Reflex Right Only] : normal on the right [Plantar Reflex Left Only] : normal on the left [FreeTextEntry6] : Limited range of motion in the neck and back, tenderness to percussion in the paraspinal muscles [FreeTextEntry7] : Decree sensation distally, positive straight leg test bilaterally [FreeTextEntry8] : Positive Romberg, trouble with tandem, antalgic gait

## 2024-04-07 NOTE — ASSESSMENT
[FreeTextEntry1] : Chronic cervical and lumbar radiculopathy/polyneuropathy -Continue with Lyrica 100 mg TID.  -follow up with pain management -Follow up in 6 months for reevaluation.  Total clinician time spent  is  21 minutes including preparing to see the patient, obtaining and/or reviewing and confirming history, performing a medically necessary and appropriate examination, counseling and educating the patient and/or family, documenting clinical information in the HER and communicating and/or referring to other healthcare professionals.    I, Brenda Thurston, attest that this documentation has been prepared under the direction and in the presence of Provider Luther Holland DO  Thank you for allowing me to assist in the management of this patient.  Luther Holland DO Board Certified, Neurology.

## 2024-04-24 ENCOUNTER — APPOINTMENT (OUTPATIENT)
Dept: PAIN MANAGEMENT | Facility: CLINIC | Age: 59
End: 2024-04-24
Payer: COMMERCIAL

## 2024-04-24 VITALS — HEIGHT: 66 IN | BODY MASS INDEX: 39.37 KG/M2 | WEIGHT: 245 LBS

## 2024-04-24 DIAGNOSIS — M54.12 RADICULOPATHY, CERVICAL REGION: ICD-10-CM

## 2024-04-24 DIAGNOSIS — M47.812 SPONDYLOSIS W/OUT MYELOPATHY OR RADICULOPATHY, CERVICAL REGION: ICD-10-CM

## 2024-04-24 PROCEDURE — 99213 OFFICE O/P EST LOW 20 MIN: CPT

## 2024-04-24 RX ORDER — METHYLPREDNISOLONE 4 MG/1
4 TABLET ORAL
Qty: 1 | Refills: 0 | Status: ACTIVE | COMMUNITY
Start: 2024-04-24 | End: 1900-01-01

## 2024-04-24 NOTE — HISTORY OF PRESENT ILLNESS
[FreeTextEntry1] : Mr. Mart is a 58 year old male presenting to establish care for his neck and lower back pain. He is being referred by Dr. Holland, Neurology.   As for his neck pain, he notes pain in his neck and referred into the arms all the way into the hands. He states the pain is severe in the hands. He has significant numbness and tingling in the hands especially in the fingers. He notes at times trouble with grasping objects.   As for his lower back pain, he is presenting with mainly axial lower back pain. He states the pain is associated with stiffness and spasms in the back along with pain with any sort of rotational movements of the back. He states the pain is worse with getting up from a seated position. He denies any radicular component.   Of note, he is currently taking a blood thinner.   He states the pain started after he had his stents put in in 2019. Since then, the pain has been getting progressively worse. He states the pain is moderate/severe, rated at a 7/10 on the pain scale. On some days, he states the pain can be a 10/10 on the pain scale. He states the pain is intermittent and worsens in no typical pattern. He describes the pain as a sharp, burning, dull/aching and throbbing like sensation. She states the pain is increased with lying down, standing, sitting and walking. He states there is no change in pain with exercising, relaxing, coughing/sneezing and bowel movements. He can only sit for 1 hour before pain arises. He never has to lie down secondary to the pain. He utilizes no assisted walking device.   Prior pain treatments: No relief with physical therapy, heat and cold treatment.   Prior pain mediations: Tylenol. Currently taking Pregabalin 50 mg TID and Gabapentin 300 mg TID.   TODAY, 4-: Revisit encounter.   As for his lower back pain, he states the pain continues to be tolerable. He states the pain is only flared up with aggressive physical activity.   As for his neck pain, he states the pain continues to be bothersome. He states the pain is in the neck and refers into the bilateral upper extremities. He has numbness and tingling in the upper extremities all the way into the hands. The pain has been waxing and weening. Of note, he recently saw his cardiologist and was not cleared to come off the Brilinta to undergo an epidural.   As for his bilateral hand pain, he states the pain has been improving. He started wearing a wrist splint which is helping. He can tolerate using tools a lot better.   He is also presenting today with bilateral knee pain. He states the pain is worse with putting pressure over the knee joints. He states he cannot stand or walk up and down the stairs without pain. Pain is present daily and constant.   He does also have bilateral hip pain. He notes pain with rotational movements of the hips. He does have a decrease in pain since he was last seen. He is able to sit for longer without excruciating pain. Pain is relieved with sitting or lying down at night.   He has been taking Methocarbamol 750 mg nightly which does provide him with 30-40% improvement in pain along with increase in function. He denies any side effects.

## 2024-04-24 NOTE — PHYSICAL EXAM
[de-identified] : C spine  No rashes, erythema, edema noted TTP b/l cervical paraspinal and trapezius  Limited ROM with neck extension and b/l left and right rotation 2/2 to pain Positive facet loading bilaterally Positive spurling test L side RUE: 5/5 strength, sensation in tact LUE: 5/5 strength, sensation in tact

## 2024-04-24 NOTE — DATA REVIEWED
[FreeTextEntry1] : MRI of the cervical spine taken on 11-2-2023 showed cervical spondylosis is present from C3-4 through C6-7 producing slight spinal cord compression at the C6-7 disc space level.   MRI of the lumbar spine taken on 11-2-2023 showed focal disc herniation l5-S1 to the right of midline approaching the exiting right S1 nerve root sleeve. Mild to moderate lumbar stenosis L4-5. Small annular bulge L3-4.   X-ray of the bilateral hips taken on 3-2-2024 showed mild degenerative joint space narrowing and acetabular sclerosis is present bilaterally.   X-ray of the bilateral knees taken on 3-2-2024 showed very slight marginal osteophytosis and subcortical degenerative change may be present along articular surfaces of both patella.

## 2024-04-24 NOTE — DISCUSSION/SUMMARY
[de-identified] : A discussion regarding available pain management treatment options occurred with the patient. These included interventional, rehabilitative, pharmacological, and alternative modalities. We will proceed with the following:   Interventional treatment options: - Patient is a candidate for a cervical epidural steroid injection.  * He is currently on Brilinta. His cardiologist will not clear him at the present to stop his blood thinner to undergo this injection.   - Continue with an active HEP I gave the patient a list of home exercises to do for pain reduction and overall improvement in functional status including but not limited to active neck rotation, active neck side bend, neck flexion, neck extension, chin tuck, scalene stretch, isometric neck flexion, isometric neck extension, isometric neck sidebend, headlift/neck curl, headlift/neck sidebend, neck extension on hands and knees, and scapula squeeze.   Medications: 1. methylprednisolone dose pack prescribed for intractable cervical radicular pain  - Follow up in 1 month.   I Marichuy Maravilla attest that this documentation has been prepared under the direction and in the presence of provider Dr. Abdon Paige.  The documentation recorded by the scribe in my presence, accurately reflects the service I personally performed, and the decisions made by me with my edits as appropriate.   Abdno Paige, DO

## 2024-06-03 ENCOUNTER — RESULT CHARGE (OUTPATIENT)
Age: 59
End: 2024-06-03

## 2024-06-03 ENCOUNTER — NON-APPOINTMENT (OUTPATIENT)
Age: 59
End: 2024-06-03

## 2024-06-03 ENCOUNTER — APPOINTMENT (OUTPATIENT)
Dept: ORTHOPEDIC SURGERY | Facility: CLINIC | Age: 59
End: 2024-06-03
Payer: OTHER MISCELLANEOUS

## 2024-06-03 PROCEDURE — 73140 X-RAY EXAM OF FINGER(S): CPT | Mod: LT

## 2024-06-03 PROCEDURE — 99203 OFFICE O/P NEW LOW 30 MIN: CPT | Mod: ACP

## 2024-06-03 NOTE — HISTORY OF PRESENT ILLNESS
[de-identified] : Patient is a 58-year-old male here for evaluation of left acute finger injury.  Patient states while at work on 5/29/2024 is also cut his finger.  Patient states he was immediate pain.  Patient went to urgent care.  Patient was given a tetanus shot and sutures were applied to the wound.  Patient states that he is feeling much better.  Has been using splint for comfort.  Denies numbness or tingling, denies instability of finger

## 2024-06-03 NOTE — DISCUSSION/SUMMARY
[de-identified] : Discussed with patient that he is doing well at this point, skin looks like it is healing well, no sign of infection, no damage to the bone, ligaments or nerves.  Discussed with patient that he is able to return to work light duty.  Advised no heavy lifting, pushing or pulling with left hand.  Patient will follow-up in a few days for reevaluation and suture removal.  Patient understands agrees with plan.  Patient is currently minimaly partially temporarily disabled.  Advised patient that he can wash his hands with soap and water.

## 2024-06-03 NOTE — IMAGING
[de-identified] : Left small finger exam: No effusion, no ecchymosis.  Noted sutures placed on the lateral aspect of the distal phalanx.  No injury to nailbed.  Mild tenderness palpation to distal phalanx and proximal phalanx.  Full range of motion with good strength, ligamentously intact, neurovascular intact, good capillary reflex.  No instability.

## 2024-06-07 ENCOUNTER — APPOINTMENT (OUTPATIENT)
Dept: ORTHOPEDIC SURGERY | Facility: CLINIC | Age: 59
End: 2024-06-07
Payer: OTHER MISCELLANEOUS

## 2024-06-07 DIAGNOSIS — S61.217A LACERATION W/OUT FOREIGN BODY OF LEFT LITTLE FINGER W/OUT DAMAGE TO NAIL, INITIAL ENCOUNTER: ICD-10-CM

## 2024-06-07 PROCEDURE — 99213 OFFICE O/P EST LOW 20 MIN: CPT | Mod: ACP

## 2024-06-07 NOTE — IMAGING
[de-identified] : Left small finger exam: No effusion no ecchymosis, no erythema.  Suture to lateral aspect dorsal aspect distal phalanx removed.  No discharge, no sign of infection, good range of motion ligamentously intact, neurovascular intact.

## 2024-06-07 NOTE — DISCUSSION/SUMMARY
[de-identified] : Suture was removed.  Patient is doing well at this time.  Discussed with patient that he can return to work full duty, no restrictions.  Advised patient to call if symptoms worsen or change.  Patient understands the plan.

## 2024-06-07 NOTE — HISTORY OF PRESENT ILLNESS
[de-identified] : Patient is a 58-year-old male here for reevaluation of left pinky finger [employer] injury.  Patient here today for suture removal wound check.  Patient states he has been doing well, denies pain, numbness/tingling, denies fever/chills, denies wound discharge

## 2024-07-09 ENCOUNTER — APPOINTMENT (OUTPATIENT)
Dept: ORTHOPEDIC SURGERY | Facility: CLINIC | Age: 59
End: 2024-07-09

## 2024-07-09 DIAGNOSIS — G56.03 CARPAL TUNNEL SYNDROM,BILATERAL UPPER LIMBS: ICD-10-CM

## 2024-07-09 PROCEDURE — 99203 OFFICE O/P NEW LOW 30 MIN: CPT

## 2024-07-25 ENCOUNTER — RX RENEWAL (OUTPATIENT)
Age: 59
End: 2024-07-25

## 2024-08-16 ENCOUNTER — RX RENEWAL (OUTPATIENT)
Age: 59
End: 2024-08-16

## 2024-09-06 ENCOUNTER — APPOINTMENT (OUTPATIENT)
Dept: PAIN MANAGEMENT | Facility: CLINIC | Age: 59
End: 2024-09-06

## 2024-09-06 DIAGNOSIS — M25.562 PAIN IN RIGHT KNEE: ICD-10-CM

## 2024-09-06 DIAGNOSIS — G89.29 PAIN IN RIGHT KNEE: ICD-10-CM

## 2024-09-06 DIAGNOSIS — M25.551 PAIN IN RIGHT HIP: ICD-10-CM

## 2024-09-06 DIAGNOSIS — M25.552 PAIN IN RIGHT HIP: ICD-10-CM

## 2024-09-06 DIAGNOSIS — G56.03 CARPAL TUNNEL SYNDROM,BILATERAL UPPER LIMBS: ICD-10-CM

## 2024-09-06 DIAGNOSIS — M25.561 PAIN IN RIGHT KNEE: ICD-10-CM

## 2024-09-06 DIAGNOSIS — G89.29 PAIN IN RIGHT HIP: ICD-10-CM

## 2024-09-06 PROCEDURE — 99214 OFFICE O/P EST MOD 30 MIN: CPT

## 2024-09-06 RX ORDER — ROSUVASTATIN CALCIUM 5 MG/1
TABLET, FILM COATED ORAL
Refills: 0 | Status: ACTIVE | COMMUNITY

## 2024-09-06 NOTE — DISCUSSION/SUMMARY
[de-identified] : A discussion regarding available pain management treatment options occurred with the patient. These included interventional, rehabilitative, pharmacological, and alternative modalities. We will proceed with the following:   Interventional treatment options: 1.  Proceed with TAMAR  Treatment options were discussed. The patient has been having persistent, severe neck pain and cervical radicular pain that has minimally improved with conservative therapy thus far (including physical therapy/chiropractic manipulations/home stretching and anti-inflammatory medications for 8 weeks. The patient was given the option of proceeding with a cervical epidural steroid injection to try to get the patient some pain relief.   The risks and benefits were discussed, which included the associated problems with steroids, bleeding, nerve injury, lack of improvement with pain, and 0.5% chance of a post dural puncture headache.   * He is currently on Brilinta.  We got clearance from his cardiologist.  - Continue with an active HEP I gave the patient a list of home exercises to do for pain reduction and overall improvement in functional status including but not limited to active neck rotation, active neck side bend, neck flexion, neck extension, chin tuck, scalene stretch, isometric neck flexion, isometric neck extension, isometric neck sidebend, headlift/neck curl, headlift/neck sidebend, neck extension on hands and knees, and scapula squeeze.   Medications: 1. Ordered pregabalin 100 mg 3 times daily - Sent a 30-day supply - We are going to start pregabalin. Potential side effects were discussed which included dizziness, sedation, and pedal edema to name a few. If the patient cannot tolerate these side effects should they occur, then they will stop the medication. If the patient experiences sedation, they understand that they should not drive. The usage of the medication was discussed and the patient understands that it is an anti-epileptic medication used for neuropathic pain and that the pain relief from this medication will likely be subtle, and that hopefully in combination with the other treatments we are offering we will be able to get some additive relief. 2.  Ordered Methocarbamol 750 mg at nighttime as needed - We are prescribing a muscle relaxant medication to help the patient's muscle spasm pain. The patient understands to not take this medication before driving or operating any heavy machinery as it can be sedating.  - Follow up 2 weeks post injection  HELENE Acevedo,

## 2024-09-06 NOTE — PHYSICAL EXAM
[de-identified] : C spine  No rashes, erythema, edema noted TTP b/l cervical paraspinal and trapezius  Limited ROM with neck extension and b/l left and right rotation 2/2 to pain Positive facet loading bilaterally Positive spurling test L side RUE: 5/5 strength, sensation in tact LUE: 5/5 strength, sensation in tact

## 2024-09-06 NOTE — HISTORY OF PRESENT ILLNESS
[FreeTextEntry1] : Mr. Mart is a 58 year old male presenting to establish care for his neck and lower back pain. He is being referred by Dr. Holland, Neurology.   As for his neck pain, he notes pain in his neck and referred into the arms all the way into the hands. He states the pain is severe in the hands. He has significant numbness and tingling in the hands especially in the fingers. He notes at times trouble with grasping objects.   As for his lower back pain, he is presenting with mainly axial lower back pain. He states the pain is associated with stiffness and spasms in the back along with pain with any sort of rotational movements of the back. He states the pain is worse with getting up from a seated position. He denies any radicular component.   Of note, he is currently taking a blood thinner.   He states the pain started after he had his stents put in in 2019. Since then, the pain has been getting progressively worse. He states the pain is moderate/severe, rated at a 7/10 on the pain scale. On some days, he states the pain can be a 10/10 on the pain scale. He states the pain is intermittent and worsens in no typical pattern. He describes the pain as a sharp, burning, dull/aching and throbbing like sensation. She states the pain is increased with lying down, standing, sitting and walking. He states there is no change in pain with exercising, relaxing, coughing/sneezing and bowel movements. He can only sit for 1 hour before pain arises. He never has to lie down secondary to the pain. He utilizes no assisted walking device.   Prior pain treatments: No relief with physical therapy, heat and cold treatment.   Prior pain mediations: Tylenol. Currently taking Pregabalin 50 mg TID and Gabapentin 300 mg TID.   TODAY, 9 6 -2024: Revisit encounter.  As for his lower back pain, he states the pain continues to be tolerable. He states the pain is only flared up with aggressive physical activity.   As for his neck pain, he states the pain continues to be bothersome. He states the pain is in the neck and refers into the bilateral upper extremities. He has numbness and tingling in the upper extremities all the way into the hands. The pain has been waxing and weening. Of note, he recently saw his cardiologist and was not cleared to come off the Brilinta to undergo an epidural.   As for his bilateral hand pain, he states the pain has been improving. He started wearing a wrist splint which is helping. He can tolerate using tools a lot better.  He saw Dr. Ma which she recommended to continue with wearing the wrist splint, hold off any intervention/surgery at this point  He is also presenting today with bilateral knee pain. He states the pain is worse with putting pressure over the knee joints. He states he cannot stand or walk up and down the stairs without pain. Pain is present daily and constant.   He does also have bilateral hip pain. He notes pain with rotational movements of the hips. He does have a decrease in pain since he was last seen. He is able to sit for longer without excruciating pain. Pain is relieved with sitting or lying down at night.   He has been taking Methocarbamol 750 mg nightly and pregabalin 100 mg 3 times daily which does provide him with 30-40% improvement in pain along with increase in function. He denies any side effects.

## 2024-09-12 ENCOUNTER — APPOINTMENT (OUTPATIENT)
Dept: PAIN MANAGEMENT | Facility: CLINIC | Age: 59
End: 2024-09-12
Payer: COMMERCIAL

## 2024-09-12 DIAGNOSIS — M54.12 RADICULOPATHY, CERVICAL REGION: ICD-10-CM

## 2024-09-12 PROCEDURE — 62321 NJX INTERLAMINAR CRV/THRC: CPT

## 2024-09-13 NOTE — PROCEDURE
[FreeTextEntry3] : Date of Service: 09/12/2024   Account: [id number]  Patient: MAGED DEL CID   YOB: 1965  Age: 58 year  Surgeon:      Abdon Paige D.O.  Assistant:    None  Pre-Operative Diagnosis:         Cervical Radiculopathy (M54.12)  Post Operative Diagnosis:       Cervical Radiculopathy (M54.12)  Procedure:             Cervical (C7-T1) interlaminar epidural steroid injection under fluoroscopic guidance  Anesthesia:  MAC  This procedure was carried out using fluoroscopic guidance.  The risks and benefits of the procedure were discussed extensively with the patient.  The consent of the patient was obtained and the following procedure was performed. The patient was placed in the prone position on the fluoroscopy table and the area was prepped and draped in a sterile fashion.  A timeout was performed with all essential staff present and the site and side were verified.  The patient was placed in the prone position and optimized to patient comfort.  The cervical area was prepped and draped in a sterile fashion.  The fluoroscope visualized the C7-T1 interspace using slight cephalad-caudad angulation and this area was marked.  Using sterile technique the superficial skin was anesthetized with 1% Lidocaine.  A 20 gauge 3.5 inch Tuohy needle was advanced under fluoroscopy until ligament was engaged.  Using a contralateral oblique view, a "loss of resistance" to air technique was utilized in order to gain access to the epidural space.  After negative aspiration for heme and CSF, 1 cc of Omnipaque contrast was administered and the appropriate cervical epidurogram was obtained in the ANNA and A/P view as well as digital subtraction angiography.  A total injectate of 3 cc of preservative free normal saline and 10mg decadron was then injected into the epidural space while maintaining meaningful verbal contact with the patient.    The needle was subsequently removed.  Vital signs remained normal.  Pulse oximeter was used throughout the procedure and the patient's pulse and oxygen saturation remained within normal limits.  The patient tolerated the procedure well.  There were no complications.  The patient was instructed to apply ice over the injection sites for twenty minutes every two hours for the next 24 to 48 hours.  Disposition:       1. The patient was advised to F/U in 1-2 weeks to assess the response to the injection.      2. The patient was also instructed to contact me immediately if there were any concerns related to the procedure performed.

## 2024-09-24 ENCOUNTER — NON-APPOINTMENT (OUTPATIENT)
Age: 59
End: 2024-09-24

## 2024-09-26 ENCOUNTER — APPOINTMENT (OUTPATIENT)
Dept: PAIN MANAGEMENT | Facility: CLINIC | Age: 59
End: 2024-09-26

## 2024-09-27 ENCOUNTER — APPOINTMENT (OUTPATIENT)
Dept: PAIN MANAGEMENT | Facility: CLINIC | Age: 59
End: 2024-09-27

## 2024-09-27 DIAGNOSIS — M47.812 SPONDYLOSIS W/OUT MYELOPATHY OR RADICULOPATHY, CERVICAL REGION: ICD-10-CM

## 2024-09-27 DIAGNOSIS — M54.12 RADICULOPATHY, CERVICAL REGION: ICD-10-CM

## 2024-09-27 DIAGNOSIS — M47.816 SPONDYLOSIS W/OUT MYELOPATHY OR RADICULOPATHY, LUMBAR REGION: ICD-10-CM

## 2024-09-27 DIAGNOSIS — G56.03 CARPAL TUNNEL SYNDROM,BILATERAL UPPER LIMBS: ICD-10-CM

## 2024-09-27 PROCEDURE — 99214 OFFICE O/P EST MOD 30 MIN: CPT

## 2024-09-30 NOTE — DISCUSSION/SUMMARY
[de-identified] : A discussion regarding available pain management treatment options occurred with the patient. These included interventional, rehabilitative, pharmacological, and alternative modalities. We will proceed with the following:   Interventional treatment options: 1. Proceed with Bilateral carpal tunnel injections under ultrasound guidance.  The risks, benefits and alternatives of the proposed procedure were explained in detail with the patient.  The risks outlined include, but are not limited to, infection, bleeding, nerve injury, a temporary increase in pain, failure to resolve symptoms, allergic reaction, and possible elevation of blood sugar in diabetics.  All questions were answered to patient's apparent satisfaction and he/she verbalized an understanding.  - Continue with an active HEP I gave the patient a list of home exercises to do for pain reduction and overall improvement in functional status including but not limited to active neck rotation, active neck side bend, neck flexion, neck extension, chin tuck, scalene stretch, isometric neck flexion, isometric neck extension, isometric neck sidebend, headlift/neck curl, headlift/neck sidebend, neck extension on hands and knees, and scapula squeeze.   Medications: - continue with Pregabalin 100 mg q8h prn.  - continue with Methocarbamol 750 mg qhs prn.   We are prescribing a muscle relaxant medication to help the patient's muscle spasm pain. The patient understands to not take this medication before driving or operating any heavy machinery as it can be sedating.   - Follow up in 2-3 months.   I Marichuy Maravilla attest that this documentation has been prepared under the direction and in the presence of provider Dr. Abdon Paige.  The documentation recorded by the scribe in my presence, accurately reflects the service I personally performed, and the decisions made by me with my edits as appropriate.   Abdon Paige, DO

## 2024-09-30 NOTE — DISCUSSION/SUMMARY
[de-identified] : A discussion regarding available pain management treatment options occurred with the patient. These included interventional, rehabilitative, pharmacological, and alternative modalities. We will proceed with the following:   Interventional treatment options: 1. Proceed with Bilateral carpal tunnel injections under ultrasound guidance.  The risks, benefits and alternatives of the proposed procedure were explained in detail with the patient.  The risks outlined include, but are not limited to, infection, bleeding, nerve injury, a temporary increase in pain, failure to resolve symptoms, allergic reaction, and possible elevation of blood sugar in diabetics.  All questions were answered to patient's apparent satisfaction and he/she verbalized an understanding.  - Continue with an active HEP I gave the patient a list of home exercises to do for pain reduction and overall improvement in functional status including but not limited to active neck rotation, active neck side bend, neck flexion, neck extension, chin tuck, scalene stretch, isometric neck flexion, isometric neck extension, isometric neck sidebend, headlift/neck curl, headlift/neck sidebend, neck extension on hands and knees, and scapula squeeze.   Medications: - continue with Pregabalin 100 mg q8h prn.  - continue with Methocarbamol 750 mg qhs prn.   We are prescribing a muscle relaxant medication to help the patient's muscle spasm pain. The patient understands to not take this medication before driving or operating any heavy machinery as it can be sedating.   - Follow up in 2-3 months.   I Marichuy Maravilla attest that this documentation has been prepared under the direction and in the presence of provider Dr. Abdon Paige.  The documentation recorded by the scribe in my presence, accurately reflects the service I personally performed, and the decisions made by me with my edits as appropriate.   Abdon Paige, DO

## 2024-09-30 NOTE — PHYSICAL EXAM
[de-identified] : C spine  No rashes, erythema, edema noted TTP b/l cervical paraspinal and trapezius  Limited ROM with neck extension and b/l left and right rotation 2/2 to pain Positive facet loading bilaterally Positive spurling test L side RUE: 5/5 strength, sensation in tact LUE: 5/5 strength, sensation in tact    + tinels bilaterally at carpal tunnel

## 2024-09-30 NOTE — HISTORY OF PRESENT ILLNESS
[FreeTextEntry1] : Mr. Mart is a 58 year old male presenting to establish care for his neck and lower back pain. He is being referred by Dr. Holland, Neurology.   As for his neck pain, he notes pain in his neck and referred into the arms all the way into the hands. He states the pain is severe in the hands. He has significant numbness and tingling in the hands especially in the fingers. He notes at times trouble with grasping objects.   As for his lower back pain, he is presenting with mainly axial lower back pain. He states the pain is associated with stiffness and spasms in the back along with pain with any sort of rotational movements of the back. He states the pain is worse with getting up from a seated position. He denies any radicular component.   Of note, he is currently taking a blood thinner.   He states the pain started after he had his stents put in in 2019. Since then, the pain has been getting progressively worse. He states the pain is moderate/severe, rated at a 7/10 on the pain scale. On some days, he states the pain can be a 10/10 on the pain scale. He states the pain is intermittent and worsens in no typical pattern. He describes the pain as a sharp, burning, dull/aching and throbbing like sensation. She states the pain is increased with lying down, standing, sitting and walking. He states there is no change in pain with exercising, relaxing, coughing/sneezing and bowel movements. He can only sit for 1 hour before pain arises. He never has to lie down secondary to the pain. He utilizes no assisted walking device.   Prior pain treatments: No relief with physical therapy, heat and cold treatment.   Prior pain mediations: Tylenol. Currently taking Pregabalin 50 mg TID and Gabapentin 300 mg TID.   TODAY, 9 6 -2024: Revisit encounter.  As for his lower back pain, he states the pain continues to be tolerable. He states the pain is only flared up with aggressive physical activity.   As for his neck pain, he states the pain continues to be bothersome. He states the pain is in the neck and refers into the bilateral upper extremities. He has numbness and tingling in the upper extremities all the way into the hands. The pain has been waxing and weening. Of note, he recently saw his cardiologist and was not cleared to come off the Brilinta to undergo an epidural.   As for his bilateral hand pain, he states the pain has been improving. He started wearing a wrist splint which is helping. He can tolerate using tools a lot better.  He saw Dr. Ma which she recommended to continue with wearing the wrist splint, hold off any intervention/surgery at this point  He is also presenting today with bilateral knee pain. He states the pain is worse with putting pressure over the knee joints. He states he cannot stand or walk up and down the stairs without pain. Pain is present daily and constant.   He does also have bilateral hip pain. He notes pain with rotational movements of the hips. He does have a decrease in pain since he was last seen. He is able to sit for longer without excruciating pain. Pain is relieved with sitting or lying down at night.    9-: Revisit encounter.  Since his last visit, he underwent a C7-T1 cervical epidural steroid injection on 9-. He affords about 75% sustained relief from this procedure. On a day-to-day basis now, he hardly has any pain. He does continue with numbness and tingling in the hands however this is related to carpal tunnel.   With regards to his lower back pain, he states the pain is tolerable. He denies any acute changes or any worsening pain. He has been able to walk almost 6 miles without any pain. He does note some persistent axial pain which is around the waistband.    He has been taking Methocarbamol 750 mg nightly and pregabalin 100 mg 3 times daily which does provide him with 30-40% improvement in pain along with increase in function. He denies any side effects.

## 2024-09-30 NOTE — PHYSICAL EXAM
[de-identified] : C spine  No rashes, erythema, edema noted TTP b/l cervical paraspinal and trapezius  Limited ROM with neck extension and b/l left and right rotation 2/2 to pain Positive facet loading bilaterally Positive spurling test L side RUE: 5/5 strength, sensation in tact LUE: 5/5 strength, sensation in tact    + tinels bilaterally at carpal tunnel

## 2024-10-09 ENCOUNTER — APPOINTMENT (OUTPATIENT)
Dept: PAIN MANAGEMENT | Facility: CLINIC | Age: 59
End: 2024-10-09
Payer: COMMERCIAL

## 2024-10-09 DIAGNOSIS — G56.03 CARPAL TUNNEL SYNDROM,BILATERAL UPPER LIMBS: ICD-10-CM

## 2024-10-09 PROCEDURE — 20526 THER INJECTION CARP TUNNEL: CPT | Mod: 50

## 2024-10-10 ENCOUNTER — APPOINTMENT (OUTPATIENT)
Dept: NEUROLOGY | Facility: CLINIC | Age: 59
End: 2024-10-10

## 2024-10-15 ENCOUNTER — RX RENEWAL (OUTPATIENT)
Age: 59
End: 2024-10-15

## 2024-11-20 ENCOUNTER — APPOINTMENT (OUTPATIENT)
Dept: PAIN MANAGEMENT | Facility: CLINIC | Age: 59
End: 2024-11-20
Payer: COMMERCIAL

## 2024-11-20 VITALS — WEIGHT: 245 LBS | HEIGHT: 66 IN | BODY MASS INDEX: 39.37 KG/M2

## 2024-11-20 DIAGNOSIS — S83.92XA SPRAIN OF UNSPECIFIED SITE OF LEFT KNEE, INITIAL ENCOUNTER: ICD-10-CM

## 2024-11-20 PROCEDURE — 99212 OFFICE O/P EST SF 10 MIN: CPT | Mod: 25

## 2024-11-20 PROCEDURE — 20610 DRAIN/INJ JOINT/BURSA W/O US: CPT | Mod: LT

## 2024-11-21 ENCOUNTER — RX RENEWAL (OUTPATIENT)
Age: 59
End: 2024-11-21

## 2024-11-26 ENCOUNTER — RX RENEWAL (OUTPATIENT)
Age: 59
End: 2024-11-26

## 2024-12-02 ENCOUNTER — APPOINTMENT (OUTPATIENT)
Dept: MRI IMAGING | Facility: CLINIC | Age: 59
End: 2024-12-02
Payer: COMMERCIAL

## 2024-12-02 PROCEDURE — 73721 MRI JNT OF LWR EXTRE W/O DYE: CPT | Mod: LT

## 2024-12-11 ENCOUNTER — APPOINTMENT (OUTPATIENT)
Dept: PAIN MANAGEMENT | Facility: CLINIC | Age: 59
End: 2024-12-11

## 2025-05-30 ENCOUNTER — RX RENEWAL (OUTPATIENT)
Age: 60
End: 2025-05-30

## 2025-07-23 ENCOUNTER — APPOINTMENT (OUTPATIENT)
Dept: PAIN MANAGEMENT | Facility: CLINIC | Age: 60
End: 2025-07-23
Payer: MEDICAID

## 2025-07-23 DIAGNOSIS — S83.92XA SPRAIN OF UNSPECIFIED SITE OF LEFT KNEE, INITIAL ENCOUNTER: ICD-10-CM

## 2025-07-23 DIAGNOSIS — M54.16 RADICULOPATHY, LUMBAR REGION: ICD-10-CM

## 2025-07-23 PROCEDURE — 99214 OFFICE O/P EST MOD 30 MIN: CPT

## 2025-07-23 RX ORDER — METFORMIN HYDROCHLORIDE 500 MG/1
500 TABLET, COATED ORAL
Refills: 0 | Status: ACTIVE | COMMUNITY

## 2025-07-23 RX ORDER — METFORMIN HYDROCHLORIDE 750 MG/1
750 TABLET ORAL
Refills: 0 | Status: ACTIVE | COMMUNITY

## 2025-08-06 ENCOUNTER — APPOINTMENT (OUTPATIENT)
Dept: PAIN MANAGEMENT | Facility: CLINIC | Age: 60
End: 2025-08-06
Payer: MEDICAID

## 2025-08-06 DIAGNOSIS — M47.816 SPONDYLOSIS W/OUT MYELOPATHY OR RADICULOPATHY, LUMBAR REGION: ICD-10-CM

## 2025-08-06 DIAGNOSIS — M48.061 SPINAL STENOSIS, LUMBAR REGION WITHOUT NEUROGENIC CLAUDICATION: ICD-10-CM

## 2025-08-06 PROCEDURE — 99214 OFFICE O/P EST MOD 30 MIN: CPT

## 2025-08-06 RX ORDER — SEMAGLUTIDE 2.68 MG/ML
INJECTION, SOLUTION SUBCUTANEOUS
Refills: 0 | Status: ACTIVE | COMMUNITY

## 2025-08-18 ENCOUNTER — APPOINTMENT (OUTPATIENT)
Dept: PAIN MANAGEMENT | Facility: CLINIC | Age: 60
End: 2025-08-18
Payer: MEDICAID

## 2025-08-18 DIAGNOSIS — M47.816 SPONDYLOSIS W/OUT MYELOPATHY OR RADICULOPATHY, LUMBAR REGION: ICD-10-CM

## 2025-08-18 PROCEDURE — 64494 INJ PARAVERT F JNT L/S 2 LEV: CPT | Mod: 50,59

## 2025-08-18 PROCEDURE — 64493 INJ PARAVERT F JNT L/S 1 LEV: CPT | Mod: 50

## 2025-09-03 ENCOUNTER — APPOINTMENT (OUTPATIENT)
Dept: PAIN MANAGEMENT | Facility: CLINIC | Age: 60
End: 2025-09-03
Payer: MEDICAID

## 2025-09-03 DIAGNOSIS — M48.061 SPINAL STENOSIS, LUMBAR REGION WITHOUT NEUROGENIC CLAUDICATION: ICD-10-CM

## 2025-09-03 DIAGNOSIS — M47.816 SPONDYLOSIS W/OUT MYELOPATHY OR RADICULOPATHY, LUMBAR REGION: ICD-10-CM

## 2025-09-03 PROCEDURE — 99214 OFFICE O/P EST MOD 30 MIN: CPT
